# Patient Record
Sex: MALE | Race: BLACK OR AFRICAN AMERICAN | HISPANIC OR LATINO | URBAN - METROPOLITAN AREA
[De-identification: names, ages, dates, MRNs, and addresses within clinical notes are randomized per-mention and may not be internally consistent; named-entity substitution may affect disease eponyms.]

---

## 2017-08-25 ENCOUNTER — INPATIENT (INPATIENT)
Facility: HOSPITAL | Age: 82
LOS: 6 days | Discharge: NURSING, REHAB OR LONG-TERM CARE | DRG: 193 | End: 2017-09-01
Attending: INTERNAL MEDICINE | Admitting: INTERNAL MEDICINE
Payer: MEDICARE

## 2017-08-25 VITALS
DIASTOLIC BLOOD PRESSURE: 78 MMHG | TEMPERATURE: 99 F | WEIGHT: 199.96 LBS | SYSTOLIC BLOOD PRESSURE: 138 MMHG | OXYGEN SATURATION: 97 % | RESPIRATION RATE: 19 BRPM | HEART RATE: 64 BPM

## 2017-08-25 DIAGNOSIS — J18.9 PNEUMONIA, UNSPECIFIED ORGANISM: ICD-10-CM

## 2017-08-25 DIAGNOSIS — I63.9 CEREBRAL INFARCTION, UNSPECIFIED: ICD-10-CM

## 2017-08-25 DIAGNOSIS — R56.9 UNSPECIFIED CONVULSIONS: ICD-10-CM

## 2017-08-25 DIAGNOSIS — N40.0 BENIGN PROSTATIC HYPERPLASIA WITHOUT LOWER URINARY TRACT SYMPTOMS: ICD-10-CM

## 2017-08-25 DIAGNOSIS — I50.9 HEART FAILURE, UNSPECIFIED: ICD-10-CM

## 2017-08-25 DIAGNOSIS — R79.89 OTHER SPECIFIED ABNORMAL FINDINGS OF BLOOD CHEMISTRY: ICD-10-CM

## 2017-08-25 DIAGNOSIS — I10 ESSENTIAL (PRIMARY) HYPERTENSION: ICD-10-CM

## 2017-08-25 DIAGNOSIS — Z29.9 ENCOUNTER FOR PROPHYLACTIC MEASURES, UNSPECIFIED: ICD-10-CM

## 2017-08-25 LAB
ALBUMIN SERPL ELPH-MCNC: 3.9 G/DL — SIGNIFICANT CHANGE UP (ref 3.5–5)
ALP SERPL-CCNC: 220 U/L — HIGH (ref 40–120)
ALT FLD-CCNC: 42 U/L DA — SIGNIFICANT CHANGE UP (ref 10–60)
ANION GAP SERPL CALC-SCNC: 9 MMOL/L — SIGNIFICANT CHANGE UP (ref 5–17)
APPEARANCE UR: CLEAR — SIGNIFICANT CHANGE UP
AST SERPL-CCNC: 35 U/L — SIGNIFICANT CHANGE UP (ref 10–40)
BACTERIA # UR AUTO: ABNORMAL /HPF
BASE EXCESS BLDA CALC-SCNC: -5.3 MMOL/L — LOW (ref -2–2)
BASOPHILS # BLD AUTO: 0.1 K/UL — SIGNIFICANT CHANGE UP (ref 0–0.2)
BASOPHILS NFR BLD AUTO: 1 % — SIGNIFICANT CHANGE UP (ref 0–2)
BILIRUB DIRECT SERPL-MCNC: 0.5 MG/DL — HIGH (ref 0–0.2)
BILIRUB INDIRECT FLD-MCNC: 1.2 MG/DL — HIGH (ref 0.2–1)
BILIRUB SERPL-MCNC: 1.6 MG/DL — HIGH (ref 0.2–1.2)
BILIRUB SERPL-MCNC: 1.7 MG/DL — HIGH (ref 0.2–1.2)
BILIRUB UR-MCNC: NEGATIVE — SIGNIFICANT CHANGE UP
BUN SERPL-MCNC: 23 MG/DL — HIGH (ref 7–18)
CALCIUM SERPL-MCNC: 9.1 MG/DL — SIGNIFICANT CHANGE UP (ref 8.4–10.5)
CHLORIDE SERPL-SCNC: 102 MMOL/L — SIGNIFICANT CHANGE UP (ref 96–108)
CK MB BLD-MCNC: 2 % — SIGNIFICANT CHANGE UP (ref 0–3.5)
CK MB CFR SERPL CALC: 2.2 NG/ML — SIGNIFICANT CHANGE UP (ref 0–3.6)
CK SERPL-CCNC: 110 U/L — SIGNIFICANT CHANGE UP (ref 35–232)
CO2 SERPL-SCNC: 22 MMOL/L — SIGNIFICANT CHANGE UP (ref 22–31)
COLOR SPEC: YELLOW — SIGNIFICANT CHANGE UP
CREAT SERPL-MCNC: 1.28 MG/DL — SIGNIFICANT CHANGE UP (ref 0.5–1.3)
DIFF PNL FLD: ABNORMAL
EOSINOPHIL # BLD AUTO: 0.1 K/UL — SIGNIFICANT CHANGE UP (ref 0–0.5)
EOSINOPHIL NFR BLD AUTO: 0.8 % — SIGNIFICANT CHANGE UP (ref 0–6)
EPI CELLS # UR: ABNORMAL (ref 0–10)
GLUCOSE SERPL-MCNC: 109 MG/DL — HIGH (ref 70–99)
GLUCOSE UR QL: NEGATIVE — SIGNIFICANT CHANGE UP
HCO3 BLDA-SCNC: 17 MMOL/L — LOW (ref 23–27)
HCT VFR BLD CALC: 38 % — LOW (ref 39–50)
HGB BLD-MCNC: 12.3 G/DL — LOW (ref 13–17)
HOROWITZ INDEX BLDA+IHG-RTO: 21 — SIGNIFICANT CHANGE UP
INR BLD: 3 RATIO — HIGH (ref 0.88–1.16)
KETONES UR-MCNC: ABNORMAL
LACTATE SERPL-SCNC: 2 MMOL/L — SIGNIFICANT CHANGE UP (ref 0.7–2)
LEUKOCYTE ESTERASE UR-ACNC: ABNORMAL
LYMPHOCYTES # BLD AUTO: 1.2 K/UL — SIGNIFICANT CHANGE UP (ref 1–3.3)
LYMPHOCYTES # BLD AUTO: 12.7 % — LOW (ref 13–44)
MCHC RBC-ENTMCNC: 28.5 PG — SIGNIFICANT CHANGE UP (ref 27–34)
MCHC RBC-ENTMCNC: 32.4 GM/DL — SIGNIFICANT CHANGE UP (ref 32–36)
MCV RBC AUTO: 87.8 FL — SIGNIFICANT CHANGE UP (ref 80–100)
MONOCYTES # BLD AUTO: 0.8 K/UL — SIGNIFICANT CHANGE UP (ref 0–0.9)
MONOCYTES NFR BLD AUTO: 8 % — SIGNIFICANT CHANGE UP (ref 2–14)
NEUTROPHILS # BLD AUTO: 7.5 K/UL — HIGH (ref 1.8–7.4)
NEUTROPHILS NFR BLD AUTO: 77.5 % — HIGH (ref 43–77)
NITRITE UR-MCNC: NEGATIVE — SIGNIFICANT CHANGE UP
NT-PROBNP SERPL-SCNC: 4337 PG/ML — HIGH (ref 0–450)
PCO2 BLDA: 27 MMHG — LOW (ref 32–46)
PH BLDA: 7.43 — SIGNIFICANT CHANGE UP (ref 7.35–7.45)
PH UR: 5 — SIGNIFICANT CHANGE UP (ref 5–8)
PLATELET # BLD AUTO: 177 K/UL — SIGNIFICANT CHANGE UP (ref 150–400)
PO2 BLDA: 77 MMHG — SIGNIFICANT CHANGE UP (ref 74–108)
POTASSIUM SERPL-MCNC: 4.3 MMOL/L — SIGNIFICANT CHANGE UP (ref 3.5–5.3)
POTASSIUM SERPL-SCNC: 4.3 MMOL/L — SIGNIFICANT CHANGE UP (ref 3.5–5.3)
PROT SERPL-MCNC: 8.2 G/DL — SIGNIFICANT CHANGE UP (ref 6–8.3)
PROT UR-MCNC: 100
PROTHROM AB SERPL-ACNC: 33.4 SEC — HIGH (ref 9.8–12.7)
RBC # BLD: 4.33 M/UL — SIGNIFICANT CHANGE UP (ref 4.2–5.8)
RBC # FLD: 13.5 % — SIGNIFICANT CHANGE UP (ref 10.3–14.5)
RBC CASTS # UR COMP ASSIST: ABNORMAL /HPF (ref 0–2)
SAO2 % BLDA: 95 % — SIGNIFICANT CHANGE UP (ref 92–96)
SODIUM SERPL-SCNC: 133 MMOL/L — LOW (ref 135–145)
SP GR SPEC: 1.02 — SIGNIFICANT CHANGE UP (ref 1.01–1.02)
TROPONIN I SERPL-MCNC: 0.09 NG/ML — HIGH (ref 0–0.04)
TROPONIN I SERPL-MCNC: 0.12 NG/ML — HIGH (ref 0–0.04)
UROBILINOGEN FLD QL: 4
WBC # BLD: 9.7 K/UL — SIGNIFICANT CHANGE UP (ref 3.8–10.5)
WBC # FLD AUTO: 9.7 K/UL — SIGNIFICANT CHANGE UP (ref 3.8–10.5)
WBC UR QL: SIGNIFICANT CHANGE UP /HPF (ref 0–5)

## 2017-08-25 PROCEDURE — 76705 ECHO EXAM OF ABDOMEN: CPT | Mod: 26

## 2017-08-25 PROCEDURE — 71010: CPT | Mod: 26

## 2017-08-25 PROCEDURE — 71275 CT ANGIOGRAPHY CHEST: CPT | Mod: 26

## 2017-08-25 PROCEDURE — 99285 EMERGENCY DEPT VISIT HI MDM: CPT

## 2017-08-25 RX ORDER — LEVETIRACETAM 250 MG/1
1 TABLET, FILM COATED ORAL
Qty: 0 | Refills: 0 | COMMUNITY

## 2017-08-25 RX ORDER — WARFARIN SODIUM 2.5 MG/1
5 TABLET ORAL ONCE
Qty: 0 | Refills: 0 | Status: DISCONTINUED | OUTPATIENT
Start: 2017-08-25 | End: 2017-08-25

## 2017-08-25 RX ORDER — LOSARTAN POTASSIUM 100 MG/1
50 TABLET, FILM COATED ORAL DAILY
Qty: 0 | Refills: 0 | Status: DISCONTINUED | OUTPATIENT
Start: 2017-08-25 | End: 2017-08-25

## 2017-08-25 RX ORDER — WARFARIN SODIUM 2.5 MG/1
3 TABLET ORAL ONCE
Qty: 0 | Refills: 0 | Status: COMPLETED | OUTPATIENT
Start: 2017-08-25 | End: 2017-08-25

## 2017-08-25 RX ORDER — LOSARTAN POTASSIUM 100 MG/1
1 TABLET, FILM COATED ORAL
Qty: 0 | Refills: 0 | COMMUNITY

## 2017-08-25 RX ORDER — ASPIRIN/CALCIUM CARB/MAGNESIUM 324 MG
325 TABLET ORAL ONCE
Qty: 0 | Refills: 0 | Status: COMPLETED | OUTPATIENT
Start: 2017-08-25 | End: 2017-08-25

## 2017-08-25 RX ORDER — ASPIRIN/CALCIUM CARB/MAGNESIUM 324 MG
81 TABLET ORAL DAILY
Qty: 0 | Refills: 0 | Status: DISCONTINUED | OUTPATIENT
Start: 2017-08-25 | End: 2017-09-01

## 2017-08-25 RX ORDER — IPRATROPIUM/ALBUTEROL SULFATE 18-103MCG
3 AEROSOL WITH ADAPTER (GRAM) INHALATION ONCE
Qty: 0 | Refills: 0 | Status: COMPLETED | OUTPATIENT
Start: 2017-08-25 | End: 2017-08-25

## 2017-08-25 RX ORDER — LEVETIRACETAM 250 MG/1
1000 TABLET, FILM COATED ORAL
Qty: 0 | Refills: 0 | Status: DISCONTINUED | OUTPATIENT
Start: 2017-08-25 | End: 2017-09-01

## 2017-08-25 RX ORDER — TAMSULOSIN HYDROCHLORIDE 0.4 MG/1
0.4 CAPSULE ORAL AT BEDTIME
Qty: 0 | Refills: 0 | Status: DISCONTINUED | OUTPATIENT
Start: 2017-08-25 | End: 2017-09-01

## 2017-08-25 RX ORDER — FUROSEMIDE 40 MG
20 TABLET ORAL ONCE
Qty: 0 | Refills: 0 | Status: COMPLETED | OUTPATIENT
Start: 2017-08-25 | End: 2017-08-25

## 2017-08-25 RX ORDER — METOPROLOL TARTRATE 50 MG
12.5 TABLET ORAL
Qty: 0 | Refills: 0 | Status: DISCONTINUED | OUTPATIENT
Start: 2017-08-25 | End: 2017-08-29

## 2017-08-25 RX ORDER — CITALOPRAM 10 MG/1
10 TABLET, FILM COATED ORAL DAILY
Qty: 0 | Refills: 0 | Status: DISCONTINUED | OUTPATIENT
Start: 2017-08-25 | End: 2017-09-01

## 2017-08-25 RX ORDER — ATORVASTATIN CALCIUM 80 MG/1
40 TABLET, FILM COATED ORAL AT BEDTIME
Qty: 0 | Refills: 0 | Status: DISCONTINUED | OUTPATIENT
Start: 2017-08-25 | End: 2017-09-01

## 2017-08-25 RX ORDER — FUROSEMIDE 40 MG
40 TABLET ORAL DAILY
Qty: 0 | Refills: 0 | Status: DISCONTINUED | OUTPATIENT
Start: 2017-08-25 | End: 2017-08-29

## 2017-08-25 RX ORDER — LOSARTAN POTASSIUM 100 MG/1
25 TABLET, FILM COATED ORAL DAILY
Qty: 0 | Refills: 0 | Status: DISCONTINUED | OUTPATIENT
Start: 2017-08-25 | End: 2017-09-01

## 2017-08-25 RX ORDER — TAMSULOSIN HYDROCHLORIDE 0.4 MG/1
1 CAPSULE ORAL
Qty: 0 | Refills: 0 | COMMUNITY

## 2017-08-25 RX ORDER — IPRATROPIUM/ALBUTEROL SULFATE 18-103MCG
3 AEROSOL WITH ADAPTER (GRAM) INHALATION EVERY 6 HOURS
Qty: 0 | Refills: 0 | Status: DISCONTINUED | OUTPATIENT
Start: 2017-08-25 | End: 2017-09-01

## 2017-08-25 RX ORDER — MECLIZINE HCL 12.5 MG
1 TABLET ORAL
Qty: 0 | Refills: 0 | COMMUNITY

## 2017-08-25 RX ORDER — CITALOPRAM 10 MG/1
1 TABLET, FILM COATED ORAL
Qty: 0 | Refills: 0 | COMMUNITY

## 2017-08-25 RX ORDER — CEFTRIAXONE 500 MG/1
1 INJECTION, POWDER, FOR SOLUTION INTRAMUSCULAR; INTRAVENOUS EVERY 24 HOURS
Qty: 0 | Refills: 0 | Status: DISCONTINUED | OUTPATIENT
Start: 2017-08-25 | End: 2017-09-01

## 2017-08-25 RX ADMIN — Medication 20 MILLIGRAM(S): at 13:34

## 2017-08-25 RX ADMIN — LEVETIRACETAM 1000 MILLIGRAM(S): 250 TABLET, FILM COATED ORAL at 18:53

## 2017-08-25 RX ADMIN — Medication 100 MILLIGRAM(S): at 18:52

## 2017-08-25 RX ADMIN — Medication 3 MILLILITER(S): at 21:24

## 2017-08-25 RX ADMIN — CEFTRIAXONE 100 GRAM(S): 500 INJECTION, POWDER, FOR SOLUTION INTRAMUSCULAR; INTRAVENOUS at 22:44

## 2017-08-25 RX ADMIN — Medication 3 MILLILITER(S): at 16:39

## 2017-08-25 RX ADMIN — WARFARIN SODIUM 3 MILLIGRAM(S): 2.5 TABLET ORAL at 22:33

## 2017-08-25 RX ADMIN — Medication 325 MILLIGRAM(S): at 13:35

## 2017-08-25 RX ADMIN — TAMSULOSIN HYDROCHLORIDE 0.4 MILLIGRAM(S): 0.4 CAPSULE ORAL at 22:44

## 2017-08-25 RX ADMIN — Medication 20 MILLIGRAM(S): at 12:16

## 2017-08-25 RX ADMIN — Medication 3 MILLILITER(S): at 10:15

## 2017-08-25 RX ADMIN — ATORVASTATIN CALCIUM 40 MILLIGRAM(S): 80 TABLET, FILM COATED ORAL at 22:44

## 2017-08-25 RX ADMIN — Medication 12.5 MILLIGRAM(S): at 18:54

## 2017-08-25 NOTE — ED PROVIDER NOTE - CARDIAC, MLM
Normal rate, regular rhythm.  Heart sounds S1, S2.  No murmurs, rubs or gallops. Pt w/ pedal edema b/l

## 2017-08-25 NOTE — H&P ADULT - PROBLEM SELECTOR PLAN 3
Patient has BPH, on Flomax at home, as per wife since last few months he has hesitancy, dribbling, difficulty in urination   Mild suprapubic tenderness noted on exam   Will do the bladder scan to check the post void residual  If patient retaining then might need palumbo  C/w Flomax for now Patient has BPH, on Flomax at home, as per wife since last few months he has hesitancy, dribbling, difficulty in urination   In the Ed patient urinating well  Mild suprapubic tenderness noted on exam   Will do the bladder scan to check the post void residual  If patient retaining then might need palumbo  C/w Flomax for now

## 2017-08-25 NOTE — H&P ADULT - RS GEN PE MLT RESP DETAILS PC
breath sounds equal/no wheezes/normal/no intercostal retractions/no rales/respirations non-labored/no rhonchi/no chest wall tenderness/no subcutaneous emphysema

## 2017-08-25 NOTE — H&P ADULT - PROBLEM SELECTOR PLAN 7
C/w home does of losartan 50 mg, for bisoprolol 5 mg daily ( equivalent is 50 mg, will give 12.5 BID as BP on low side)   DASH/TLC diet

## 2017-08-25 NOTE — ED PROVIDER NOTE - OBJECTIVE STATEMENT
82 y/o M pt w/ PMHx of multiple CVA and seizures presents to ED c/o SOB x1 week. Pt denies fever, chills, chest pain, nausea, vomiting, cough, or any other complaints. NKDA.

## 2017-08-25 NOTE — H&P ADULT - PROBLEM SELECTOR PLAN 6
As per patient he takes warfarin at home 5 mg , placed about 2 yrs ago, last INR checked about 1 month ago.  F/up on INR, home dose is 5 mg coumadin As per patient he takes warfarin at home 5 mg , placed about 2 yrs ago, last INR checked about 1 month ago.  F/up on INR, home dose is 5 mg coumadin, will give 3 mg today

## 2017-08-25 NOTE — H&P ADULT - PROBLEM SELECTOR PLAN 1
Worsening SOB x 1 week  Dry cough, no fever, no wbc count   Bilateral crackles on exam   CXR shows multifocal pneumonia, also congested   Got Levaquin by ED before sending blood cultures  On EKG Qtc is very prolonged to about 513 ms, no prior EKG to compare   Will give the Rocephin and doxycycline, as cannot give macrolide due to qtc prolong  ID consult   Give bronchodilators PRN , O2 supplemental PRN Worsening SOB x 1 week  Dry cough, no fever, no wbc count   Bilateral crackles on exam   CXR shows multifocal pneumonia, also congested   Got Levaquin by ED before sending blood cultures  On EKG Qtc is very prolonged to about 513 ms, no prior EKG to compare   Will give the Rocephin and doxycycline, as cannot give macrolide due to qtc prolong  Give bronchodilators PRN , O2 supplemental PRN  F/up on blood cultures

## 2017-08-25 NOTE — H&P ADULT - ASSESSMENT
In the ED vitals stable, labs penitent for Na of 133, BUN 23, Bl of 1.6, , T1 0.041, proBNP is 4337, ABG done shows mild respiratory alkalosis but no hypoxia, CXR shows pulmonary congestion, right sided pleural effusion multifocal pneumonia, CT angio chest negative for PE, got aspirin, Duoneb Lasix 20 x 2, EKG shows wide QRS rhythm with premature complexes, QTC of 513 ms, admitted to tele for ruling out ACS, CHF exacerbation, multifocal pneumonia.

## 2017-08-25 NOTE — H&P ADULT - NSHPLABSRESULTS_GEN_ALL_CORE
12.3   9.7   )-----------( 177      ( 25 Aug 2017 09:47 )             38.0       133<L>  |  102  |  23<H>  ----------------------------<  109<H>  4.3   |  22  |  1.28    Ca    9.1      25 Aug 2017 09:47    TPro  8.2  /  Alb  3.9  /  TBili  1.6<H>  /  DBili  x   /  AST  35  /  ALT  42  /  AlkPhos  220<H>    CARDIAC MARKERS ( 25 Aug 2017 09:47 )  0.091 ng/mL / x     / x     / x     / x        ABG - ( 25 Aug 2017 10:07 )  pH: 7.43  /  pCO2: 27    /  pO2: 77    / HCO3: 17    / Base Excess: -5.3  /  SaO2: 95            Urinalysis Basic - ( 25 Aug 2017 09:47 )    Color: Yellow / Appearance: Clear / S.025 / pH: x  Gluc: x / Ketone: Trace  / Bili: Negative / Urobili: 4   Blood: x / Protein: 100 / Nitrite: Negative   Leuk Esterase: Trace / RBC: 5-10 /HPF / WBC 0-2 /HPF   Sq Epi: x / Non Sq Epi: x / Bacteria: Moderate /HPF    Vital Signs Last 24 Hrs  T(C): 36.4 (25 Aug 2017 15:47), Max: 37.1 (25 Aug 2017 08:15)  T(F): 97.5 (25 Aug 2017 15:47), Max: 98.8 (25 Aug 2017 08:15)  HR: 61 (25 Aug 2017 15:47) (61 - 64)  BP: 129/87 (25 Aug 2017 15:47) (129/87 - 138/78)  BP(mean): --  RR: 17 (25 Aug 2017 15:47) (17 - 19)  SpO2: 99% (25 Aug 2017 15:47) (97% - 99%)    US Hepatic & Pancreatic (17 @ 13:43) >  Hepatic fibrofatty changes.  No biliary ductal dilatation.  Gallbladder not visualized.  Right pleural effusion.    < from: CT Angio Chest w/ IV Cont (17 @ 13:41) >    No evidence of pulmonary embolism. Moderate right and small left pleural effusion with adjacent compressive atelectasis. Mild interstitial pulmonary edema.    < from: Xray Chest 1 View AP-PORTABLE IMMEDIATE (17 @ 09:27) >    Cardiomegaly with multifocal pneumonia and congestive change. Moderate   right pleural effusion.

## 2017-08-25 NOTE — H&P ADULT - ATTENDING COMMENTS
seen and examined  d/w ER  md, resident.  pt was brought for sob, mild cough, no fever  from 4-5 days.  apetite ok.  no cp or palp.  recently lost 15 lbs as per family pcp advised to do that.  apetite is fair.  lungs clear.  trace pedal edema,  as per family. no mi in past, but had 2 cvas with rt sided weakness mainly rt U ext.   cva, aphasic, ppm, htn, bph , seizure.

## 2017-08-25 NOTE — H&P ADULT - HISTORY OF PRESENT ILLNESS
82 y/o M, Guinean speaking, walks with cane, lives with wife,  PMHx of CVA x 2 in past ( 2013,, 2014, on warfarin), seizures, BPH, HTN, PPM ( placed about 2 yrs ago, last interrogated about 2 months ago)presents to ED c/o SOB x1 week. History provided by the granddaughter and wife at the bedside. According to her patient usually feels SOB while he walks short distance uses 3- 4 pillows at night, associated with lower extremity swelling. He has PPM placed about 2 yrs ago as per family his heart not functioning fine.  He has accident in 2012, head surgery done , after that he has another stroke, placed on warfarin. Last INR checked about a month ago.  He also has dry cough. Denies fever, chills, chest pain, nausea, vomiting, sick contacts, recent travel, or any other complaints. His last visit to PMD about a week ago. Never had EGD and colonoscopy done.

## 2017-08-25 NOTE — H&P ADULT - PROBLEM SELECTOR PLAN 2
SOB, lower extremity swelling   CXR shows cardiomegaly with multifocal pneumonia and congestive change. Moderate right pleural effusion.   Probnp elevated to 4337  Mild Bilateral crackles on exam   S/p Lasix 20 x 2 in ED , patient has some difficulty in urinating , but he urinated well  C/w lasix 40 mg IV daily   Monitor I/Os, daily weights   f/up on ECHO , telemetry   No chest pain   EKG shows paced rhythm, wide QRS rhythm with premature complexes, QTC of 513 ms, no ST , T wave abnormality  T1 is slightly elevated to 0.041, f/up on T2 and t3   Has PPM ( last interrogated about 2 months ago as per wife) , company name does not remember by the patient   C/w aspirin, statin, beta blocker  Cardio consult SOB, lower extremity swelling   CXR shows cardiomegaly with multifocal pneumonia and congestive change. Moderate right pleural effusion.   Probnp elevated to 4337  Mild Bilateral crackles on exam   S/p Lasix 20 x 2 in ED , patient has some difficulty in urinating , but he urinated well  C/w lasix 40 mg IV daily   Monitor I/Os, daily weights   f/up on ECHO , telemetry   No chest pain   EKG shows paced rhythm, wide QRS rhythm with premature complexes, QTC of 513 ms, no ST , T wave abnormality  T1 is slightly elevated to 0.041, f/up on T2 and t3   Has PPM ( last interrogated about 2 months ago as per wife) , [employer].  C/w aspirin, statin, beta blocker  Cardio consult Dr Hairston

## 2017-08-25 NOTE — H&P ADULT - MUSCULOSKELETAL
detailed exam ROM intact/no joint warmth/no calf tenderness/no joint swelling/normal strength/no joint erythema/normal

## 2017-08-25 NOTE — H&P ADULT - GASTROINTESTINAL DETAILS
bowel sounds normal/no organomegaly/no guarding/soft/normal/no rigidity/no bruit/no rebound tenderness

## 2017-08-26 DIAGNOSIS — N39.0 URINARY TRACT INFECTION, SITE NOT SPECIFIED: ICD-10-CM

## 2017-08-26 LAB
24R-OH-CALCIDIOL SERPL-MCNC: 33.9 NG/ML — SIGNIFICANT CHANGE UP (ref 30–100)
ALBUMIN SERPL ELPH-MCNC: 3.5 G/DL — SIGNIFICANT CHANGE UP (ref 3.5–5)
ALP SERPL-CCNC: 174 U/L — HIGH (ref 40–120)
ALT FLD-CCNC: 34 U/L DA — SIGNIFICANT CHANGE UP (ref 10–60)
ANION GAP SERPL CALC-SCNC: 10 MMOL/L — SIGNIFICANT CHANGE UP (ref 5–17)
AST SERPL-CCNC: 37 U/L — SIGNIFICANT CHANGE UP (ref 10–40)
BASOPHILS # BLD AUTO: 0.1 K/UL — SIGNIFICANT CHANGE UP (ref 0–0.2)
BASOPHILS NFR BLD AUTO: 1 % — SIGNIFICANT CHANGE UP (ref 0–2)
BILIRUB SERPL-MCNC: 1.7 MG/DL — HIGH (ref 0.2–1.2)
BUN SERPL-MCNC: 23 MG/DL — HIGH (ref 7–18)
CALCIUM SERPL-MCNC: 8.6 MG/DL — SIGNIFICANT CHANGE UP (ref 8.4–10.5)
CHLORIDE SERPL-SCNC: 99 MMOL/L — SIGNIFICANT CHANGE UP (ref 96–108)
CHOLEST SERPL-MCNC: 120 MG/DL — SIGNIFICANT CHANGE UP (ref 10–199)
CK MB BLD-MCNC: 1.8 % — SIGNIFICANT CHANGE UP (ref 0–3.5)
CK MB BLD-MCNC: 1.9 % — SIGNIFICANT CHANGE UP (ref 0–3.5)
CK MB BLD-MCNC: 2.1 % — SIGNIFICANT CHANGE UP (ref 0–3.5)
CK MB CFR SERPL CALC: 4.6 NG/ML — HIGH (ref 0–3.6)
CK MB CFR SERPL CALC: 6.5 NG/ML — HIGH (ref 0–3.6)
CK MB CFR SERPL CALC: 6.6 NG/ML — HIGH (ref 0–3.6)
CK SERPL-CCNC: 216 U/L — SIGNIFICANT CHANGE UP (ref 35–232)
CK SERPL-CCNC: 336 U/L — HIGH (ref 35–232)
CK SERPL-CCNC: 368 U/L — HIGH (ref 35–232)
CO2 SERPL-SCNC: 22 MMOL/L — SIGNIFICANT CHANGE UP (ref 22–31)
CREAT SERPL-MCNC: 1.35 MG/DL — HIGH (ref 0.5–1.3)
EOSINOPHIL # BLD AUTO: 0.1 K/UL — SIGNIFICANT CHANGE UP (ref 0–0.5)
EOSINOPHIL NFR BLD AUTO: 0.9 % — SIGNIFICANT CHANGE UP (ref 0–6)
FOLATE SERPL-MCNC: 10.2 NG/ML — SIGNIFICANT CHANGE UP (ref 4.8–24.2)
GGT SERPL-CCNC: 436 U/L — HIGH (ref 9–50)
GLUCOSE SERPL-MCNC: 92 MG/DL — SIGNIFICANT CHANGE UP (ref 70–99)
HAV IGM SER-ACNC: SIGNIFICANT CHANGE UP
HBA1C BLD-MCNC: 5.7 % — HIGH (ref 4–5.6)
HBV CORE AB SER-ACNC: REACTIVE
HBV CORE IGM SER-ACNC: SIGNIFICANT CHANGE UP
HBV SURFACE AG SER-ACNC: SIGNIFICANT CHANGE UP
HCT VFR BLD CALC: 33.9 % — LOW (ref 39–50)
HCV AB S/CO SERPL IA: 0.21 S/CO — SIGNIFICANT CHANGE UP
HCV AB SERPL-IMP: SIGNIFICANT CHANGE UP
HDLC SERPL-MCNC: 42 MG/DL — SIGNIFICANT CHANGE UP (ref 40–125)
HGB BLD-MCNC: 11.4 G/DL — LOW (ref 13–17)
INR BLD: 3.22 RATIO — HIGH (ref 0.88–1.16)
LIPID PNL WITH DIRECT LDL SERPL: 66 MG/DL — SIGNIFICANT CHANGE UP
LYMPHOCYTES # BLD AUTO: 1 K/UL — SIGNIFICANT CHANGE UP (ref 1–3.3)
LYMPHOCYTES # BLD AUTO: 12.4 % — LOW (ref 13–44)
MAGNESIUM SERPL-MCNC: 1.9 MG/DL — SIGNIFICANT CHANGE UP (ref 1.6–2.6)
MCHC RBC-ENTMCNC: 29.5 PG — SIGNIFICANT CHANGE UP (ref 27–34)
MCHC RBC-ENTMCNC: 33.6 GM/DL — SIGNIFICANT CHANGE UP (ref 32–36)
MCV RBC AUTO: 87.8 FL — SIGNIFICANT CHANGE UP (ref 80–100)
MONOCYTES # BLD AUTO: 0.8 K/UL — SIGNIFICANT CHANGE UP (ref 0–0.9)
MONOCYTES NFR BLD AUTO: 10.5 % — SIGNIFICANT CHANGE UP (ref 2–14)
NEUTROPHILS # BLD AUTO: 6 K/UL — SIGNIFICANT CHANGE UP (ref 1.8–7.4)
NEUTROPHILS NFR BLD AUTO: 75.2 % — SIGNIFICANT CHANGE UP (ref 43–77)
PHOSPHATE SERPL-MCNC: 3.3 MG/DL — SIGNIFICANT CHANGE UP (ref 2.5–4.5)
PLATELET # BLD AUTO: 121 K/UL — LOW (ref 150–400)
POTASSIUM SERPL-MCNC: 3.8 MMOL/L — SIGNIFICANT CHANGE UP (ref 3.5–5.3)
POTASSIUM SERPL-SCNC: 3.8 MMOL/L — SIGNIFICANT CHANGE UP (ref 3.5–5.3)
PROCALCITONIN SERPL-MCNC: 0.18 NG/ML — HIGH (ref 0–0.04)
PROT SERPL-MCNC: 7.1 G/DL — SIGNIFICANT CHANGE UP (ref 6–8.3)
PROTHROM AB SERPL-ACNC: 35.9 SEC — HIGH (ref 9.8–12.7)
RBC # BLD: 3.86 M/UL — LOW (ref 4.2–5.8)
RBC # FLD: 13.1 % — SIGNIFICANT CHANGE UP (ref 10.3–14.5)
SODIUM SERPL-SCNC: 131 MMOL/L — LOW (ref 135–145)
TOTAL CHOLESTEROL/HDL RATIO MEASUREMENT: 2.9 RATIO — LOW (ref 3.4–9.6)
TRIGL SERPL-MCNC: 62 MG/DL — SIGNIFICANT CHANGE UP (ref 10–149)
TROPONIN I SERPL-MCNC: 0.16 NG/ML — HIGH (ref 0–0.04)
TROPONIN I SERPL-MCNC: 0.19 NG/ML — HIGH (ref 0–0.04)
TROPONIN I SERPL-MCNC: 0.21 NG/ML — HIGH (ref 0–0.04)
TSH SERPL-MCNC: 7.2 UU/ML — HIGH (ref 0.34–4.82)
VIT B12 SERPL-MCNC: 370 PG/ML — SIGNIFICANT CHANGE UP (ref 243–894)
WBC # BLD: 8 K/UL — SIGNIFICANT CHANGE UP (ref 3.8–10.5)
WBC # FLD AUTO: 8 K/UL — SIGNIFICANT CHANGE UP (ref 3.8–10.5)

## 2017-08-26 RX ORDER — FOLIC ACID 0.8 MG
1 TABLET ORAL DAILY
Qty: 0 | Refills: 0 | Status: DISCONTINUED | OUTPATIENT
Start: 2017-08-26 | End: 2017-09-01

## 2017-08-26 RX ORDER — LEVOTHYROXINE SODIUM 125 MCG
12.5 TABLET ORAL DAILY
Qty: 0 | Refills: 0 | Status: DISCONTINUED | OUTPATIENT
Start: 2017-08-26 | End: 2017-09-01

## 2017-08-26 RX ORDER — PREGABALIN 225 MG/1
500 CAPSULE ORAL DAILY
Qty: 0 | Refills: 0 | Status: DISCONTINUED | OUTPATIENT
Start: 2017-08-26 | End: 2017-09-01

## 2017-08-26 RX ORDER — WARFARIN SODIUM 2.5 MG/1
3 TABLET ORAL ONCE
Qty: 0 | Refills: 0 | Status: DISCONTINUED | OUTPATIENT
Start: 2017-08-26 | End: 2017-08-26

## 2017-08-26 RX ADMIN — LEVETIRACETAM 1000 MILLIGRAM(S): 250 TABLET, FILM COATED ORAL at 06:21

## 2017-08-26 RX ADMIN — Medication 100 MILLIGRAM(S): at 06:23

## 2017-08-26 RX ADMIN — CEFTRIAXONE 100 GRAM(S): 500 INJECTION, POWDER, FOR SOLUTION INTRAMUSCULAR; INTRAVENOUS at 22:39

## 2017-08-26 RX ADMIN — Medication 40 MILLIGRAM(S): at 06:21

## 2017-08-26 RX ADMIN — Medication 12.5 MILLIGRAM(S): at 17:05

## 2017-08-26 RX ADMIN — TAMSULOSIN HYDROCHLORIDE 0.4 MILLIGRAM(S): 0.4 CAPSULE ORAL at 22:38

## 2017-08-26 RX ADMIN — Medication 81 MILLIGRAM(S): at 13:34

## 2017-08-26 RX ADMIN — Medication 3 MILLILITER(S): at 03:11

## 2017-08-26 RX ADMIN — ATORVASTATIN CALCIUM 40 MILLIGRAM(S): 80 TABLET, FILM COATED ORAL at 22:38

## 2017-08-26 RX ADMIN — Medication 3 MILLILITER(S): at 09:23

## 2017-08-26 RX ADMIN — Medication 3 MILLILITER(S): at 14:40

## 2017-08-26 RX ADMIN — LEVETIRACETAM 1000 MILLIGRAM(S): 250 TABLET, FILM COATED ORAL at 17:05

## 2017-08-26 RX ADMIN — LOSARTAN POTASSIUM 25 MILLIGRAM(S): 100 TABLET, FILM COATED ORAL at 06:24

## 2017-08-26 RX ADMIN — Medication 100 MILLIGRAM(S): at 17:05

## 2017-08-26 RX ADMIN — Medication 12.5 MILLIGRAM(S): at 06:24

## 2017-08-26 RX ADMIN — CITALOPRAM 10 MILLIGRAM(S): 10 TABLET, FILM COATED ORAL at 13:34

## 2017-08-26 RX ADMIN — Medication 3 MILLILITER(S): at 20:58

## 2017-08-26 NOTE — PROGRESS NOTE ADULT - PROBLEM SELECTOR PLAN 6
As per patient he takes warfarin at home 5 mg , placed about 2 yrs ago, last INR checked about 1 month ago.  F/up on INR, home dose is 5 mg coumadin, will give 3 mg today C/w home meds keppra 1000 mg BID  F/up on levels

## 2017-08-26 NOTE — PROGRESS NOTE ADULT - PROBLEM SELECTOR PLAN 1
Worsening SOB x 1 week  Dry cough, no fever, no wbc count   Bilateral crackles on exam   CXR shows multifocal pneumonia, also congested   Got Levaquin by ED before sending blood cultures  On EKG Qtc is very prolonged to about 513 ms, no prior EKG to compare   continue with Rocephin and doxycycline  Give bronchodilators PRN , O2 supplemental PRN  F/up on blood cultures

## 2017-08-26 NOTE — PHYSICAL THERAPY INITIAL EVALUATION ADULT - CRITERIA FOR SKILLED THERAPEUTIC INTERVENTIONS
anticipated discharge recommendation/rehab potential/impairments found/functional limitations in following categories/risk reduction/prevention/therapy frequency/predicted duration of therapy intervention

## 2017-08-26 NOTE — PHYSICAL THERAPY INITIAL EVALUATION ADULT - DIAGNOSIS, PT EVAL
Overall decline in functional mobility due to generalized  weakness, SOB with increasing activities; decreased balance and endurance.

## 2017-08-26 NOTE — CONSULT NOTE ADULT - SUBJECTIVE AND OBJECTIVE BOX
Patient is a 83y old  Male who presents with a chief complaint of SOB x 1 week (25 Aug 2017 15:31)      HPI:  82 y/o M, Tajik speaking, walks with cane, lives with wife,  PMHx of CVA x 2 in past ( ,, , on warfarin), seizures, BPH, HTN, PPM ( placed about 2 yrs ago, last interrogated about 2 months ago)presents to ED c/o SOB x1 week. History provided by the granddaughter and wife at the bedside. According to her patient usually feels SOB while he walks short distance uses 3- 4 pillows at night, associated with lower extremity swelling. He has PPM placed about 2 yrs ago as per family his heart not functioning fine.  He has accident in , head surgery done , after that he has another stroke, placed on warfarin. Last INR checked about a month ago.  He also has dry cough. Denies fever, chills, chest pain, nausea, vomiting, sick contacts, recent travel, or any other complaints. His last visit to PMD about a week ago. Never had EGD and colonoscopy done. (25 Aug 2017 15:31)      PAST MEDICAL & SURGICAL HISTORY:  Seizure  CVA (cerebral vascular accident)  No significant past surgical history      PREVIOUS DIAGNOSTIC TESTING:      ECHO  FINDINGS:    STRESS  FINDINGS:    CATHETERIZATION  FINDINGS:    MEDICATIONS  (STANDING):  furosemide   Injectable 40 milliGRAM(s) IV Push daily  ALBUTerol/ipratropium for Nebulization 3 milliLiter(s) Nebulizer every 6 hours  aspirin  chewable 81 milliGRAM(s) Oral daily  atorvastatin 40 milliGRAM(s) Oral at bedtime  metoprolol 12.5 milliGRAM(s) Oral two times a day  cefTRIAXone   IVPB 1 Gram(s) IV Intermittent every 24 hours  doxycycline hyclate Capsule 100 milliGRAM(s) Oral every 12 hours  tamsulosin 0.4 milliGRAM(s) Oral at bedtime  levETIRAcetam 1000 milliGRAM(s) Oral two times a day  citalopram 10 milliGRAM(s) Oral daily  losartan 25 milliGRAM(s) Oral daily    MEDICATIONS  (PRN):      FAMILY HISTORY:      SOCIAL HISTORY:    CIGARETTES:    ALCOHOL:    REVIEW OF SYSTEMS:  CONSTITUTIONAL: No fever, weight loss, or fatigue  EYES: No eye pain, visual disturbances, or discharge  ENMT:  No difficulty hearing, tinnitus, vertigo; No sinus or throat pain  NECK: No pain or stiffness  RESPIRATORY: No cough, wheezing, chills or hemoptysis; No shortness of breath  CARDIOVASCULAR: No chest pain, palpitations, dizziness, or leg swelling  GASTROINTESTINAL: No abdominal or epigastric pain. No nausea, vomiting, or hematemesis; No diarrhea or constipation. No melena or hematochezia.  GENITOURINARY: No dysuria, frequency, hematuria, or incontinence  NEUROLOGICAL: No headaches, memory loss, loss of strength, numbness, or tremors  SKIN: No itching, burning, rashes, or lesions   LYMPH NODES: No enlarged glands  ENDOCRINE: No heat or cold intolerance; No hair loss  MUSCULOSKELETAL: No joint pain or swelling; No muscle, back, or extremity pain  PSYCHIATRIC: No depression, anxiety, mood swings, or difficulty sleeping  HEME/LYMPH: No easy bruising, or bleeding gums  ALLERY AND IMMUNOLOGIC: No hives or eczema    Vital Signs Last 24 Hrs  T(C): 36.6 (26 Aug 2017 11:49), Max: 36.9 (25 Aug 2017 22:46)  T(F): 97.8 (26 Aug 2017 11:49), Max: 98.5 (25 Aug 2017 22:46)  HR: 66 (26 Aug 2017 11:49) (61 - 75)  BP: 101/55 (26 Aug 2017 11:49) (101/55 - 134/74)  BP(mean): --  RR: 16 (26 Aug 2017 11:49) (16 - 18)  SpO2: 100% (26 Aug 2017 11:49) (96% - 906%)      PHYSICAL EXAM:  GENERAL: NAD, well-groomed, well-developed  HEAD:  Atraumatic, Normocephalic  EYES: EOMI, PERRLA, conjunctiva and sclera clear  ENMT: No tonsillar erythema, exudates, or enlargement; Moist mucous membranes, Good dentition, No lesions  NECK: Supple, No JVD, Normal thyroid  NERVOUS SYSTEM:  Alert & Oriented X3, Good concentration; Motor Strength 5/5 B/L upper and lower extremities; DTRs 2+ intact and symmetric  CHEST/LUNG: Clear to percussion bilaterally; No rales, rhonchi, wheezing, or rubs  HEART: Regular rate and rhythm; No murmurs, rubs, or gallops  ABDOMEN: Soft, Nontender, Nondistended; Bowel sounds present  EXTREMITIES:  2+ Peripheral Pulses, No clubbing, cyanosis, or edema  LYMPH: No lymphadenopathy noted  SKIN: No rashes or lesions      INTERPRETATION OF TELEMETRY:    ECG:    JULIANNEDSVASC:     LABS:                        11.4   8.0   )-----------( 121      ( 26 Aug 2017 06:22 )             33.9         131<L>  |  99  |  23<H>  ----------------------------<  92  3.8   |  22  |  1.35<H>    Ca    8.6      26 Aug 2017 06:22  Phos  3.3       Mg     1.9         TPro  7.1  /  Alb  3.5  /  TBili  1.7<H>  /  DBili  x   /  AST  37  /  ALT  34  /  AlkPhos  174<H>      CARDIAC MARKERS ( 26 Aug 2017 08:50 )  0.204 ng/mL / x     / 283 U/L / x     / 5.5 ng/mL  CARDIAC MARKERS ( 26 Aug 2017 00:09 )  0.162 ng/mL / x     / 216 U/L / x     / 4.6 ng/mL  CARDIAC MARKERS ( 25 Aug 2017 16:51 )  0.118 ng/mL / x     / 110 U/L / x     / 2.2 ng/mL  CARDIAC MARKERS ( 25 Aug 2017 09:47 )  0.091 ng/mL / x     / x     / x     / x          PT/INR - ( 26 Aug 2017 06:22 )   PT: 35.9 sec;   INR: 3.22 ratio           Urinalysis Basic - ( 25 Aug 2017 09:47 )    Color: Yellow / Appearance: Clear / S.025 / pH: x  Gluc: x / Ketone: Trace  / Bili: Negative / Urobili: 4   Blood: x / Protein: 100 / Nitrite: Negative   Leuk Esterase: Trace / RBC: 5-10 /HPF / WBC 0-2 /HPF   Sq Epi: x / Non Sq Epi: x / Bacteria: Moderate /HPF      Lipid Panel: Cholesterol, Serum 120  Direct LDL 66  HDL Cholesterol, Serum 42  Triglycerides, Serum 62    I&O's Summary      RADIOLOGY & ADDITIONAL STUDIES:

## 2017-08-26 NOTE — PROGRESS NOTE ADULT - PROBLEM SELECTOR PLAN 7
C/w home does of losartan 50 mg, for bisoprolol 5 mg daily ( equivalent is 50 mg, will give 12.5 BID as BP on low side)   DASH/TLC diet As per patient he takes warfarin at home 5 mg , placed about 2 yrs ago, last INR checked about 1 month ago.  F/up on INR, home dose is 5 mg coumadin, will give 3 mg today

## 2017-08-26 NOTE — PROGRESS NOTE ADULT - PROBLEM SELECTOR PLAN 3
Patient has BPH, on Flomax at home, as per wife since last few months he has hesitancy, dribbling, difficulty in urination   In the Ed patient urinating well  Mild suprapubic tenderness noted on exam   Will do the bladder scan to check the post void residual  If patient retaining then might need palumbo  C/w Flomax for now

## 2017-08-26 NOTE — PHYSICAL THERAPY INITIAL EVALUATION ADULT - GAIT DEVIATIONS NOTED, PT EVAL
decreased weight-shifting ability/decreased stride length/decreased velocity of limb motion/decreased step length

## 2017-08-26 NOTE — PHYSICAL THERAPY INITIAL EVALUATION ADULT - TRANSFER SAFETY CONCERNS NOTED: SIT/STAND, REHAB EVAL
decreased balance during turns/decreased weight-shifting ability/decreased sequencing ability/decreased step length

## 2017-08-26 NOTE — PROGRESS NOTE ADULT - ASSESSMENT
possible chf and pneumonia combination  antibx, diuresis  afib on coumadine  ine 3.2  inr in AM  renal insuff  watch,  mild elevation of lft.  hepatitis b, c neg  cva rt sied weakness and contrature, aphasia.  cardiology to f/u possible chf and pneumonia combination  antibx, diuresis  afib on coumadine  ine 3.2  inr in AM  renal insuff  watch,  mild elevation of lft.  hepatitis b, c neg  cva rt sied weakness and contrature, aphasia.  cardiology to f/u for elevated trp

## 2017-08-26 NOTE — PHYSICAL THERAPY INITIAL EVALUATION ADULT - PLANNED THERAPY INTERVENTIONS, PT EVAL
motor coordination training/gait training/strengthening/balance training/transfer training/stretching

## 2017-08-26 NOTE — PHYSICAL THERAPY INITIAL EVALUATION ADULT - IMPAIRED TRANSFERS: SIT/STAND, REHAB EVAL
impaired balance/impaired coordination/decreased ROM/impaired postural control/impaired motor control/decreased strength/decreased flexibility/abnormal muscle tone

## 2017-08-26 NOTE — PHYSICAL THERAPY INITIAL EVALUATION ADULT - PASSIVE RANGE OF MOTION EXAMINATION, REHAB EVAL
Right shoulder flex 100; abd 80; right wrist and fingers limited in extension due to increased tone/deficits as listed below/Left UE Passive ROM was WNL (within normal limits)/Left LE Passive ROM was WFL (within functional limits)

## 2017-08-26 NOTE — PHYSICAL THERAPY INITIAL EVALUATION ADULT - IMPAIRMENTS CONTRIBUTING TO GAIT DEVIATIONS, PT EVAL
decreased ROM/decreased flexibility/impaired motor control/abnormal muscle tone/impaired balance/decreased strength/impaired postural control/impaired coordination

## 2017-08-26 NOTE — PHYSICAL THERAPY INITIAL EVALUATION ADULT - GENERAL OBSERVATIONS, REHAB EVAL
Pt. found sitting in chair with his daughter present at bedside. Pt. on cardiac monitoring; uses O2 via NC at 2L. Pt. is aphasic and with right sided weakness (from previous stroke).

## 2017-08-26 NOTE — PROGRESS NOTE ADULT - PROBLEM SELECTOR PLAN 8
Improve score is 1, 3 month risk is 0.4, c/w coumadin for DVT ppx  No need for GI px. C/w home does of losartan 50 mg, for bisoprolol 5 mg daily ( equivalent is 50 mg, will give 12.5 BID as BP on low side)   DASH/TLC diet

## 2017-08-26 NOTE — PHYSICAL THERAPY INITIAL EVALUATION ADULT - ACTIVE RANGE OF MOTION EXAMINATION, REHAB EVAL
Left UE Active ROM was WNL (within normal limits)/Pt. with right sided weakness/Left LE Active ROM was WFL (within functional limits)

## 2017-08-26 NOTE — PROGRESS NOTE ADULT - PROBLEM SELECTOR PLAN 2
SOB, lower extremity swelling   CXR shows cardiomegaly with multifocal pneumonia and congestive change. Moderate right pleural effusion.   Probnp elevated to 4337  Mild Bilateral crackles on exam   S/p Lasix 20 x 2 in ED , patient has some difficulty in urinating , but he urinated well  C/w lasix 40 mg IV daily   Monitor I/Os, daily weights   f/up on ECHO , telemetry   No chest pain   EKG shows paced rhythm, wide QRS rhythm with premature complexes, QTC of 513 ms, no ST , T wave abnormality  T1 is slightly elevated to 0.041, f/up on T2 and t3   Has PPM ( last interrogated about 2 months ago as per wife) , [employer].  C/w aspirin, statin, beta blocker  Cardio consult Dr Hairston

## 2017-08-26 NOTE — PHYSICAL THERAPY INITIAL EVALUATION ADULT - BALANCE DISTURBANCE, IDENTIFIED IMPAIRMENT CONTRIBUTE, REHAB EVAL
impaired postural control/impaired motor control/abnormal muscle tone/impaired coordination/decreased ROM/decreased strength

## 2017-08-26 NOTE — PROGRESS NOTE ADULT - PROBLEM SELECTOR PLAN 4
ALP elevated about 220, AST/ ALT normal , bilirubin is 1.6  U/s hepatic and pancreas shows hepatic fibrofatty changes, no dilatation, right sided pleural effusion  F/up on hepatic panel, direct and indirect bl  Monitor the LFts  HepB core antibody positive,  f/u HBe antigen grossly positive UA with gross blood  f/u urine culture as outpatient grossly positive UA with moderate blood  f/u urine culture as outpatient

## 2017-08-26 NOTE — PHYSICAL THERAPY INITIAL EVALUATION ADULT - IMPAIRMENTS FOUND, PT EVAL
aerobic capacity/endurance/fine motor/muscle strength/gross motor/gait, locomotion, and balance/ROM/ventilation and respiration/gas exchange

## 2017-08-26 NOTE — PROGRESS NOTE ADULT - SUBJECTIVE AND OBJECTIVE BOX
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 History provided by the granddaughter and wife at the bedside. According to her patient usually feels SOB while he walks short distance uses 3- 4 pillows at night, associated with lower extremity swelling. He has PPM placed about 2 yrs ago as per family his heart not functioning fine.  He has accident in , head surgery done , after that he has another stroke, placed on warfarin. Last INR checked about a month ago.  He also has dry cough. Denies fever, chills, chest pain, nausea, vomiting, sick contacts, recent travel, or any other complaints. His last visit to PMD about a week ago. Never had EGD and colonoscopy done. (25 Aug 2017 15:31)      Patient is a 83y old  Male who presents with a chief complaint of SOB x 1 week (25 Aug 2017 15:31)      INTERVAL HPI/OVERNIGHT EVENTS:  T(C): 36.6 (17 @ 11:49), Max: 36.9 (17 @ 22:46)  HR: 66 (17 @ 11:49) (61 - 75)  BP: 101/55 (17 @ 11:49) (101/55 - 134/74)  RR: 16 (17 @ 11:49) (16 - 18)  SpO2: 100% (17 @ 11:49) (96% - 906%)  Wt(kg): --  I&O's Summary      REVIEW OF SYSTEMS: denies fever, chills, SOB, palpitations, chest pain, abdominal pain, nausea, vomitting, diarrhea, constipation, dizziness    MEDICATIONS  (STANDING):  furosemide   Injectable 40 milliGRAM(s) IV Push daily  ALBUTerol/ipratropium for Nebulization 3 milliLiter(s) Nebulizer every 6 hours  aspirin  chewable 81 milliGRAM(s) Oral daily  atorvastatin 40 milliGRAM(s) Oral at bedtime  metoprolol 12.5 milliGRAM(s) Oral two times a day  cefTRIAXone   IVPB 1 Gram(s) IV Intermittent every 24 hours  doxycycline hyclate Capsule 100 milliGRAM(s) Oral every 12 hours  tamsulosin 0.4 milliGRAM(s) Oral at bedtime  levETIRAcetam 1000 milliGRAM(s) Oral two times a day  citalopram 10 milliGRAM(s) Oral daily  losartan 25 milliGRAM(s) Oral daily  warfarin 3 milliGRAM(s) Oral once  levothyroxine 12.5 MICROGram(s) Oral daily  cyanocobalamin 500 MICROGram(s) Oral daily  folic acid 0.4 milliGRAM(s) Oral daily    MEDICATIONS  (PRN):      PHYSICAL EXAM:  GENERAL: NAD, well-groomed, well-developed  HEAD:  Atraumatic, Normocephalic  EYES: EOMI, PERRLA, conjunctiva and sclera clear  ENMT: No tonsillar erythema, exudates, or enlargement; Moist mucous membranes, Good dentition, No lesions  NECK: Supple, No JVD, Normal thyroid  NERVOUS SYSTEM:  Alert & Oriented X3, Good concentration; Motor Strength 5/5 B/L upper and lower extremities; DTRs 2+ intact and symmetric  CHEST/LUNG: Clear to percussion bilaterally; No rales, rhonchi, wheezing, or rubs  HEART: Regular rate and rhythm; No murmurs, rubs, or gallops  ABDOMEN: Soft, Nontender, Nondistended; Bowel sounds present  EXTREMITIES:  2+ Peripheral Pulses, No clubbing, cyanosis, or edema  LYMPH: No lymphadenopathy noted  SKIN: No rashes or lesions  LABS:                        11.4   8.0   )-----------( 121      ( 26 Aug 2017 06:22 )             33.9         131<L>  |  99  |  23<H>  ----------------------------<  92  3.8   |  22  |  1.35<H>    Ca    8.6      26 Aug 2017 06:22  Phos  3.3       Mg     1.9         TPro  7.1  /  Alb  3.5  /  TBili  1.7<H>  /  DBili  x   /  AST  37  /  ALT  34  /  AlkPhos  174<H>      PT/INR - ( 26 Aug 2017 06:22 )   PT: 35.9 sec;   INR: 3.22 ratio           Urinalysis Basic - ( 25 Aug 2017 09:47 )    Color: Yellow / Appearance: Clear / S.025 / pH: x  Gluc: x / Ketone: Trace  / Bili: Negative / Urobili: 4   Blood: x / Protein: 100 / Nitrite: Negative   Leuk Esterase: Trace / RBC: 5-10 /HPF / WBC 0-2 /HPF   Sq Epi: x / Non Sq Epi: x / Bacteria: Moderate /HPF      CAPILLARY BLOOD GLUCOSE          ABG - ( 25 Aug 2017 10:07 )  pH: 7.43  /  pCO2: 27    /  pO2: 77    / HCO3: 17    / Base Excess: -5.3  /  SaO2: 95                Urinalysis Basic - ( 25 Aug 2017 09:47 )    Color: Yellow / Appearance: Clear / S.025 / pH: x  Gluc: x / Ketone: Trace  / Bili: Negative / Urobili: 4   Blood: x / Protein: 100 / Nitrite: Negative   Leuk Esterase: Trace / RBC: 5-10 /HPF / WBC 0-2 /HPF   Sq Epi: x / Non Sq Epi: x / Bacteria: Moderate /HPF

## 2017-08-26 NOTE — PROGRESS NOTE ADULT - SUBJECTIVE AND OBJECTIVE BOX
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 History provided by the granddaughter and wife at the bedside. According to her patient usually feels SOB while he walks short distance uses 3- 4 pillows at night, associated with lower extremity swelling. He has PPM placed about 2 yrs ago as per family his heart not functioning fine.  He has accident in , head surgery done , after that he has another stroke, placed on warfarin. Last INR checked about a month ago.  He also has dry cough. Denies fever, chills, chest pain, nausea, vomiting, sick contacts, recent travel, or any other complaints. His last visit to PMD about a week ago. Never had EGD and colonoscopy done. (25 Aug 2017 15:31)      Patient is a 83y old  Male who presents with a chief complaint of SOB x 1 week (25 Aug 2017 15:31)      INTERVAL HPI/OVERNIGHT EVENTS:    Pt seen lying in bed, comfortable. Pt is nonverbal. Pt's daughter is at bedside.   Nurse reports no acute events overnight.     T(C): 36.6 (17 @ 11:49), Max: 36.9 (17 @ 22:46)  HR: 66 (17 @ 11:49) (61 - 75)  BP: 101/55 (17 @ 11:49) (101/55 - 134/74)  RR: 16 (17 @ 11:49) (16 - 18)  SpO2: 100% (17 @ 11:49) (96% - 906%)  Wt(kg): --  I&O's Summary      REVIEW OF SYSTEMS: denies fever, chills, SOB, palpitations, chest pain, abdominal pain, nausea, vomitting, diarrhea, constipation, dizziness    MEDICATIONS  (STANDING):  furosemide   Injectable 40 milliGRAM(s) IV Push daily  ALBUTerol/ipratropium for Nebulization 3 milliLiter(s) Nebulizer every 6 hours  aspirin  chewable 81 milliGRAM(s) Oral daily  atorvastatin 40 milliGRAM(s) Oral at bedtime  metoprolol 12.5 milliGRAM(s) Oral two times a day  cefTRIAXone   IVPB 1 Gram(s) IV Intermittent every 24 hours  doxycycline hyclate Capsule 100 milliGRAM(s) Oral every 12 hours  tamsulosin 0.4 milliGRAM(s) Oral at bedtime  levETIRAcetam 1000 milliGRAM(s) Oral two times a day  citalopram 10 milliGRAM(s) Oral daily  losartan 25 milliGRAM(s) Oral daily    MEDICATIONS  (PRN):      PHYSICAL EXAM:  GENERAL: NAD, well-developed  HEAD:  Atraumatic, Normocephalic  EYES: EOMI, conjunctiva and sclera clear  NECK: Supple, No JVD  NERVOUS SYSTEM:  Alert & Oriented X3, Good concentration  CHEST/LUNG: wheeze heard on RLL  HEART: Regular rate and rhythm; No murmurs, rubs, or gallops  ABDOMEN: Soft, Nontender, Nondistended; Bowel sounds present  EXTREMITIES:  2+ Peripheral Pulses, No clubbing, cyanosis, or edema    LABS:                        11.4   8.0   )-----------( 121      ( 26 Aug 2017 06:22 )             33.9     08-    131<L>  |  99  |  23<H>  ----------------------------<  92  3.8   |  22  |  1.35<H>    Ca    8.6      26 Aug 2017 06:22  Phos  3.3     -  Mg     1.9         TPro  7.1  /  Alb  3.5  /  TBili  1.7<H>  /  DBili  x   /  AST  37  /  ALT  34  /  AlkPhos  174<H>  -    PT/INR - ( 26 Aug 2017 06:22 )   PT: 35.9 sec;   INR: 3.22 ratio           Urinalysis Basic - ( 25 Aug 2017 09:47 )    Color: Yellow / Appearance: Clear / S.025 / pH: x  Gluc: x / Ketone: Trace  / Bili: Negative / Urobili: 4   Blood: x / Protein: 100 / Nitrite: Negative   Leuk Esterase: Trace / RBC: 5-10 /HPF / WBC 0-2 /HPF   Sq Epi: x / Non Sq Epi: x / Bacteria: Moderate /HPF      CAPILLARY BLOOD GLUCOSE          ABG - ( 25 Aug 2017 10:07 )  pH: 7.43  /  pCO2: 27    /  pO2: 77    / HCO3: 17    / Base Excess: -5.3  /  SaO2: 95                Urinalysis Basic - ( 25 Aug 2017 09:47 )    Color: Yellow / Appearance: Clear / S.025 / pH: x  Gluc: x / Ketone: Trace  / Bili: Negative / Urobili: 4   Blood: x / Protein: 100 / Nitrite: Negative   Leuk Esterase: Trace / RBC: 5-10 /HPF / WBC 0-2 /HPF   Sq Epi: x / Non Sq Epi: x / Bacteria: Moderate /HPF

## 2017-08-27 LAB
ALBUMIN SERPL ELPH-MCNC: 3.7 G/DL — SIGNIFICANT CHANGE UP (ref 3.5–5)
ALP SERPL-CCNC: 185 U/L — HIGH (ref 40–120)
ALT FLD-CCNC: 38 U/L DA — SIGNIFICANT CHANGE UP (ref 10–60)
ANION GAP SERPL CALC-SCNC: 10 MMOL/L — SIGNIFICANT CHANGE UP (ref 5–17)
AST SERPL-CCNC: 49 U/L — HIGH (ref 10–40)
BILIRUB SERPL-MCNC: 1.4 MG/DL — HIGH (ref 0.2–1.2)
BUN SERPL-MCNC: 21 MG/DL — HIGH (ref 7–18)
CALCIUM SERPL-MCNC: 8.7 MG/DL — SIGNIFICANT CHANGE UP (ref 8.4–10.5)
CHLORIDE SERPL-SCNC: 97 MMOL/L — SIGNIFICANT CHANGE UP (ref 96–108)
CO2 SERPL-SCNC: 23 MMOL/L — SIGNIFICANT CHANGE UP (ref 22–31)
CREAT SERPL-MCNC: 1.33 MG/DL — HIGH (ref 0.5–1.3)
GLUCOSE SERPL-MCNC: 101 MG/DL — HIGH (ref 70–99)
HCT VFR BLD CALC: 35.2 % — LOW (ref 39–50)
HGB BLD-MCNC: 11.7 G/DL — LOW (ref 13–17)
INR BLD: 3.17 RATIO — HIGH (ref 0.88–1.16)
LEVETIRACETAM SERPL-MCNC: 39.7 MCG/ML — SIGNIFICANT CHANGE UP (ref 12–46)
MAGNESIUM SERPL-MCNC: 1.9 MG/DL — SIGNIFICANT CHANGE UP (ref 1.6–2.6)
MCHC RBC-ENTMCNC: 29.2 PG — SIGNIFICANT CHANGE UP (ref 27–34)
MCHC RBC-ENTMCNC: 33.3 GM/DL — SIGNIFICANT CHANGE UP (ref 32–36)
MCV RBC AUTO: 87.7 FL — SIGNIFICANT CHANGE UP (ref 80–100)
PHOSPHATE SERPL-MCNC: 3.5 MG/DL — SIGNIFICANT CHANGE UP (ref 2.5–4.5)
PLATELET # BLD AUTO: 142 K/UL — LOW (ref 150–400)
POTASSIUM SERPL-MCNC: 3.3 MMOL/L — LOW (ref 3.5–5.3)
POTASSIUM SERPL-SCNC: 3.3 MMOL/L — LOW (ref 3.5–5.3)
PROT SERPL-MCNC: 7.8 G/DL — SIGNIFICANT CHANGE UP (ref 6–8.3)
PROTHROM AB SERPL-ACNC: 35.4 SEC — HIGH (ref 9.8–12.7)
RBC # BLD: 4.01 M/UL — LOW (ref 4.2–5.8)
RBC # FLD: 13.1 % — SIGNIFICANT CHANGE UP (ref 10.3–14.5)
SODIUM SERPL-SCNC: 130 MMOL/L — LOW (ref 135–145)
WBC # BLD: 9.8 K/UL — SIGNIFICANT CHANGE UP (ref 3.8–10.5)
WBC # FLD AUTO: 9.8 K/UL — SIGNIFICANT CHANGE UP (ref 3.8–10.5)

## 2017-08-27 PROCEDURE — 74000: CPT | Mod: 26

## 2017-08-27 RX ORDER — TRAMADOL HYDROCHLORIDE 50 MG/1
25 TABLET ORAL ONCE
Qty: 0 | Refills: 0 | Status: DISCONTINUED | OUTPATIENT
Start: 2017-08-27 | End: 2017-08-27

## 2017-08-27 RX ORDER — POTASSIUM CHLORIDE 20 MEQ
40 PACKET (EA) ORAL EVERY 4 HOURS
Qty: 0 | Refills: 0 | Status: COMPLETED | OUTPATIENT
Start: 2017-08-27 | End: 2017-08-27

## 2017-08-27 RX ADMIN — Medication 12.5 MICROGRAM(S): at 06:20

## 2017-08-27 RX ADMIN — Medication 3 MILLILITER(S): at 03:19

## 2017-08-27 RX ADMIN — LEVETIRACETAM 1000 MILLIGRAM(S): 250 TABLET, FILM COATED ORAL at 06:21

## 2017-08-27 RX ADMIN — TRAMADOL HYDROCHLORIDE 25 MILLIGRAM(S): 50 TABLET ORAL at 12:39

## 2017-08-27 RX ADMIN — LEVETIRACETAM 1000 MILLIGRAM(S): 250 TABLET, FILM COATED ORAL at 17:38

## 2017-08-27 RX ADMIN — Medication 3 MILLILITER(S): at 20:13

## 2017-08-27 RX ADMIN — Medication 100 MILLIGRAM(S): at 17:38

## 2017-08-27 RX ADMIN — PREGABALIN 500 MICROGRAM(S): 225 CAPSULE ORAL at 12:39

## 2017-08-27 RX ADMIN — Medication 12.5 MILLIGRAM(S): at 06:22

## 2017-08-27 RX ADMIN — Medication 81 MILLIGRAM(S): at 12:39

## 2017-08-27 RX ADMIN — Medication 40 MILLIEQUIVALENT(S): at 13:52

## 2017-08-27 RX ADMIN — Medication 40 MILLIGRAM(S): at 06:18

## 2017-08-27 RX ADMIN — CEFTRIAXONE 100 GRAM(S): 500 INJECTION, POWDER, FOR SOLUTION INTRAMUSCULAR; INTRAVENOUS at 21:26

## 2017-08-27 RX ADMIN — ATORVASTATIN CALCIUM 40 MILLIGRAM(S): 80 TABLET, FILM COATED ORAL at 21:26

## 2017-08-27 RX ADMIN — Medication 1 MILLIGRAM(S): at 12:39

## 2017-08-27 RX ADMIN — CITALOPRAM 10 MILLIGRAM(S): 10 TABLET, FILM COATED ORAL at 12:39

## 2017-08-27 RX ADMIN — TAMSULOSIN HYDROCHLORIDE 0.4 MILLIGRAM(S): 0.4 CAPSULE ORAL at 21:26

## 2017-08-27 RX ADMIN — Medication 100 MILLIGRAM(S): at 06:20

## 2017-08-27 RX ADMIN — Medication 40 MILLIEQUIVALENT(S): at 10:10

## 2017-08-27 RX ADMIN — TRAMADOL HYDROCHLORIDE 25 MILLIGRAM(S): 50 TABLET ORAL at 13:50

## 2017-08-27 RX ADMIN — LOSARTAN POTASSIUM 25 MILLIGRAM(S): 100 TABLET, FILM COATED ORAL at 06:22

## 2017-08-27 RX ADMIN — Medication 3 MILLILITER(S): at 09:22

## 2017-08-27 RX ADMIN — Medication 12.5 MILLIGRAM(S): at 17:38

## 2017-08-27 NOTE — PROGRESS NOTE ADULT - SUBJECTIVE AND OBJECTIVE BOX
HPI:  84 y/o M, Yi speaking, walks with cane, lives with wife,  PMHx of CVA x 2 in past ( 2013,, 2014, on warfarin), seizures, BPH, HTN, PPM ( placed about 2 yrs ago, last interrogated about 2 months ago)presents to ED c/o SOB x1 week. History provided by the granddaughter and wife at the bedside. According to her patient usually feels SOB while he walks short distance uses 3- 4 pillows at night, associated with lower extremity swelling. He has PPM placed about 2 yrs ago as per family his heart not functioning fine.  He has accident in 2012, head surgery done , after that he has another stroke, placed on warfarin. Last INR checked about a month ago.  He also has dry cough. Denies fever, chills, chest pain, nausea, vomiting, sick contacts, recent travel, or any other complaints. His last visit to PMD about a week ago. Never had EGD and colonoscopy done. (25 Aug 2017 15:31)      Patient is a 83y old  Male who presents with a chief complaint of SOB x 1 week (25 Aug 2017 15:31)    PT is c/o abd pain in lowe abd from today am.  no nausea or vomiting, ate also.  no dysurea.      INTERVAL HPI/OVERNIGHT EVENTS:  T(C): 36.6 (08-27-17 @ 15:44), Max: 37 (08-27-17 @ 04:42)  HR: 63 (08-27-17 @ 15:44) (63 - 86)  BP: 115/74 (08-27-17 @ 15:44) (110/69 - 145/83)  RR: 18 (08-27-17 @ 15:44) (16 - 22)  SpO2: 100% (08-27-17 @ 15:44) (95% - 100%)  Wt(kg): --  I&O's Summary    26 Aug 2017 07:01  -  27 Aug 2017 07:00  --------------------------------------------------------  IN: 800 mL / OUT: 0 mL / NET: 800 mL        REVIEW OF SYSTEMS: denies fever, chills, SOB, palpitations, chest pain, abdominal pain, nausea, vomitting, diarrhea, constipation, dizziness    MEDICATIONS  (STANDING):  furosemide   Injectable 40 milliGRAM(s) IV Push daily  ALBUTerol/ipratropium for Nebulization 3 milliLiter(s) Nebulizer every 6 hours  aspirin  chewable 81 milliGRAM(s) Oral daily  atorvastatin 40 milliGRAM(s) Oral at bedtime  metoprolol 12.5 milliGRAM(s) Oral two times a day  cefTRIAXone   IVPB 1 Gram(s) IV Intermittent every 24 hours  doxycycline hyclate Capsule 100 milliGRAM(s) Oral every 12 hours  tamsulosin 0.4 milliGRAM(s) Oral at bedtime  levETIRAcetam 1000 milliGRAM(s) Oral two times a day  citalopram 10 milliGRAM(s) Oral daily  losartan 25 milliGRAM(s) Oral daily  levothyroxine 12.5 MICROGram(s) Oral daily  cyanocobalamin 500 MICROGram(s) Oral daily  folic acid 1 milliGRAM(s) Oral daily    MEDICATIONS  (PRN):      PHYSICAL EXAM:  GENERAL: NAD, well-groomed, well-developed  HEAD:  Atraumatic, Normocephalic  EYES: EOMI, PERRLA, conjunctiva and sclera clear  ENMT: No tonsillar erythema, exudates, or enlargement; Moist mucous membranes, Good dentition, No lesions  NECK: Supple, No JVD, Normal thyroid  NERVOUS SYSTEM:  Alert & Oriented X3, Good concentration; Motor Strength 5/5 B/L upper and lower extremities; DTRs 2+ intact and symmetric  CHEST/LUNG: Clear to percussion bilaterally; No rales, rhonchi, wheezing, or rubs  HEART: Regular rate and rhythm; No murmurs, rubs, or gallops  ABDOMEN: Soft, , Nondistended; Bowel sounds present   rt lower abd  and suprapubic tenderness .  mcburney negative.  cruz nrg  EXTREMITIES:  2+ Peripheral Pulses, No clubbing, cyanosis, or edema  LYMPH: No lymphadenopathy noted  SKIN: No rashes or lesions  LABS:                        11.7   9.8   )-----------( 142      ( 27 Aug 2017 07:24 )             35.2     08-27    130<L>  |  97  |  21<H>  ----------------------------<  101<H>  3.3<L>   |  23  |  1.33<H>    Ca    8.7      27 Aug 2017 07:24  Phos  3.5     08-27  Mg     1.9     08-27    TPro  7.8  /  Alb  3.7  /  TBili  1.4<H>  /  DBili  x   /  AST  49<H>  /  ALT  38  /  AlkPhos  185<H>  08-27    PT/INR - ( 27 Aug 2017 07:24 )   PT: 35.4 sec;   INR: 3.17 ratio             CAPILLARY BLOOD GLUCOSE

## 2017-08-27 NOTE — PROGRESS NOTE ADULT - SUBJECTIVE AND OBJECTIVE BOX
SUBJ:84 y/o M, Yakut speaking, walks with cane, lives with wife,  PMHx of CVA x 2 in past ( 2013,, 2014, on warfarin), seizures, BPH, HTN, PPM ( placed about 2 yrs ago, last interrogated about 2 months ago)presents to ED c/o SOB x1 week.No chest pain,refers to dysiria-unable to pass urine-Hx Penile implant,still c/o cough      MEDICATIONS  (STANDING):  furosemide   Injectable 40 milliGRAM(s) IV Push daily  ALBUTerol/ipratropium for Nebulization 3 milliLiter(s) Nebulizer every 6 hours  aspirin  chewable 81 milliGRAM(s) Oral daily  atorvastatin 40 milliGRAM(s) Oral at bedtime  metoprolol 12.5 milliGRAM(s) Oral two times a day  cefTRIAXone   IVPB 1 Gram(s) IV Intermittent every 24 hours  doxycycline hyclate Capsule 100 milliGRAM(s) Oral every 12 hours  tamsulosin 0.4 milliGRAM(s) Oral at bedtime  levETIRAcetam 1000 milliGRAM(s) Oral two times a day  citalopram 10 milliGRAM(s) Oral daily  losartan 25 milliGRAM(s) Oral daily  levothyroxine 12.5 MICROGram(s) Oral daily  cyanocobalamin 500 MICROGram(s) Oral daily  folic acid 1 milliGRAM(s) Oral daily  potassium chloride    Tablet ER 40 milliEquivalent(s) Oral every 4 hours    MEDICATIONS  (PRN):            Vital Signs Last 24 Hrs  T(C): 36.6 (27 Aug 2017 07:27), Max: 37 (27 Aug 2017 04:42)  T(F): 97.8 (27 Aug 2017 07:27), Max: 98.6 (27 Aug 2017 04:42)  HR: 63 (27 Aug 2017 07:27) (63 - 86)  BP: 145/83 (27 Aug 2017 07:27) (101/55 - 145/83)  BP(mean): --  RR: 18 (27 Aug 2017 07:27) (16 - 18)  SpO2: 100% (27 Aug 2017 07:27) (96% - 100%)    REVIEW OF SYSTEMS:  CONSTITUTIONAL: No fever, weight loss, or fatigue  EYES: No eye pain, visual disturbances, or discharge  ENMT:  No difficulty hearing, tinnitus, vertigo; No sinus or throat pain  NECK: No pain or stiffness  RESPIRATORY: No cough, wheezing, chills or hemoptysis; No shortness of breath  CARDIOVASCULAR: No chest pain, palpitations, dizziness, or leg swelling  GASTROINTESTINAL: No abdominal or epigastric pain. No nausea, vomiting, or hematemesis; No diarrhea or constipation. No melena or hematochezia.  GENITOURINARY: No dysuria, frequency, hematuria, or incontinence  NEUROLOGICAL: No headaches, memory loss, loss of strength, numbness, or tremors  SKIN: No itching, burning, rashes, or lesions   LYMPH NODES: No enlarged glands  ENDOCRINE: No heat or cold intolerance; No hair loss  MUSCULOSKELETAL: No joint pain or swelling; No muscle, back, or extremity pain  PSYCHIATRIC: No depression, anxiety, mood swings, or difficulty sleeping  HEME/LYMPH: No easy bruising, or bleeding gums  ALLERY AND IMMUNOLOGIC: No hives or eczema    PHYSICAL EXAM:  · CONSTITUTIONAL:	Well-developed, well nourished    BMI-  · EYES:	EOMI; PERRL; no drainage or redness  · ENMT	No oral lesions; no gross abnormalities  · NECK:	No bruits; no thyromegaly or nodules  ·BACK:	No deformity or limitation of movement  ·RESPIRATORY:   airway patent; breath sounds equal; good air movement; respirations non-labored; clear to auscultation bilaterally; no chest wall tenderness; no intercostal retractions; no rales,rhonchi or wheeze  · CARDIOVASCULAR	regular rate and rhythm  no rub  no murmur  normal PMI  . GASTROINTESTINAL:  no distention; no masses palpable; bowel sounds normal; no rebound tenderness; no guarding; no rigidity; no organomegaly  · EXTREMITIES: No cyanosis, clubbing 1+ edema  · VASCULAR: 	Equal and normal pulses (carotid, femoral, dorsalis pedis)  ·NEUROLOGICAL:   alert and oriented x 3; sensation intact; deep reflexes intact; cranial nerves intact; no spontaneous movement; superficial reflexes intact; normal strength  · SKIN:	No lesions; no rash  . LYMPH NODES:	No lymphadedenopathy  · MUSCULOSKELETAL:   No calf tenderness  no joint swelling	  TELEMETRY:Ventricular paced rhythm    ECG:Venyricular paced rhythm normal capture and sensing.    TTE:Pending    LABS:                        11.7   9.8   )-----------( 142      ( 27 Aug 2017 07:24 )             35.2     08-27    130<L>  |  97  |  21<H>  ----------------------------<  101<H>  3.3<L>   |  23  |  1.33<H>    Ca    8.7      27 Aug 2017 07:24  Phos  3.5     08-27  Mg     1.9     08-27    TPro  7.8  /  Alb  3.7  /  TBili  1.4<H>  /  DBili  x   /  AST  49<H>  /  ALT  38  /  AlkPhos  185<H>  08-27    CARDIAC MARKERS ( 26 Aug 2017 20:09 )  0.193 ng/mL / x     / 368 U/L / x     / 6.6 ng/mL  CARDIAC MARKERS ( 26 Aug 2017 13:33 )  0.215 ng/mL / x     / 336 U/L / x     / 6.5 ng/mL  CARDIAC MARKERS ( 26 Aug 2017 08:50 )  0.204 ng/mL / x     / 283 U/L / x     / 5.5 ng/mL  CARDIAC MARKERS ( 26 Aug 2017 00:09 )  0.162 ng/mL / x     / 216 U/L / x     / 4.6 ng/mL  CARDIAC MARKERS ( 25 Aug 2017 16:51 )  0.118 ng/mL / x     / 110 U/L / x     / 2.2 ng/mL      PT/INR - ( 27 Aug 2017 07:24 )   PT: 35.4 sec;   INR: 3.17 ratio      I&O's Summary    26 Aug 2017 07:01  -  27 Aug 2017 07:00  --------------------------------------------------------  IN: 800 mL / OUT: 0 mL / NET: 800 mL    RADIOLOGY & ADDITIONAL STUDIES:   CT ANGIO CHEST (W)AW IC      MPRESSION: No evidence of pulmonary embolismModerate right and small left pleural effusion with adjacent compressive  atelectasis.Mild interstitial pulmonary edema.        IMPRESSION AND PLAN:    Problem/Plan - 1:  ·  Problem: Multifocal pneumonia.  Plan: Worsening SOB x 1 week  Dry cough, no fever, no wbc count   Bilateral crackles on exam   CXR shows multifocal pneumonia, also congested   Got Levaquin by ED before sending blood cultures,awaiting Legionella titres    Problem/Plan - 2:  ·  Problem: CHF exacerbation.Likely Systolic-  Plan: SOB, lower extremity swelling   CXR shows cardiomegaly with multifocal pneumonia and congestive change. Moderate right pleural effusion.   Probnp elevated to 4337  Mild Bilateral crackles on exam   C/w lasix 40 mg IV daily   Monitor I/Os, daily weights -Difficulty urinating-Bladder scan  f/up on ECHO , telemetry

## 2017-08-28 ENCOUNTER — TRANSCRIPTION ENCOUNTER (OUTPATIENT)
Age: 82
End: 2017-08-28

## 2017-08-28 LAB
ALBUMIN SERPL ELPH-MCNC: 3.4 G/DL — LOW (ref 3.5–5)
ALP SERPL-CCNC: 174 U/L — HIGH (ref 40–120)
ALT FLD-CCNC: 36 U/L DA — SIGNIFICANT CHANGE UP (ref 10–60)
ANION GAP SERPL CALC-SCNC: 9 MMOL/L — SIGNIFICANT CHANGE UP (ref 5–17)
APPEARANCE UR: ABNORMAL
AST SERPL-CCNC: 50 U/L — HIGH (ref 10–40)
BASOPHILS # BLD AUTO: 0.1 K/UL — SIGNIFICANT CHANGE UP (ref 0–0.2)
BASOPHILS NFR BLD AUTO: 0.9 % — SIGNIFICANT CHANGE UP (ref 0–2)
BILIRUB SERPL-MCNC: 1.2 MG/DL — SIGNIFICANT CHANGE UP (ref 0.2–1.2)
BILIRUB UR-MCNC: ABNORMAL
BUN SERPL-MCNC: 22 MG/DL — HIGH (ref 7–18)
CALCIUM SERPL-MCNC: 9 MG/DL — SIGNIFICANT CHANGE UP (ref 8.4–10.5)
CHLORIDE SERPL-SCNC: 99 MMOL/L — SIGNIFICANT CHANGE UP (ref 96–108)
CO2 SERPL-SCNC: 24 MMOL/L — SIGNIFICANT CHANGE UP (ref 22–31)
COLOR SPEC: ABNORMAL
CREAT SERPL-MCNC: 1.15 MG/DL — SIGNIFICANT CHANGE UP (ref 0.5–1.3)
CULTURE RESULTS: NO GROWTH — SIGNIFICANT CHANGE UP
DIFF PNL FLD: ABNORMAL
EOSINOPHIL # BLD AUTO: 0.2 K/UL — SIGNIFICANT CHANGE UP (ref 0–0.5)
EOSINOPHIL NFR BLD AUTO: 2.4 % — SIGNIFICANT CHANGE UP (ref 0–6)
GLUCOSE SERPL-MCNC: 105 MG/DL — HIGH (ref 70–99)
GLUCOSE UR QL: NEGATIVE — SIGNIFICANT CHANGE UP
HCT VFR BLD CALC: 32.4 % — LOW (ref 39–50)
HGB BLD-MCNC: 11.1 G/DL — LOW (ref 13–17)
INR BLD: 3.11 RATIO — HIGH (ref 0.88–1.16)
KETONES UR-MCNC: ABNORMAL
LEUKOCYTE ESTERASE UR-ACNC: ABNORMAL
LYMPHOCYTES # BLD AUTO: 0.8 K/UL — LOW (ref 1–3.3)
LYMPHOCYTES # BLD AUTO: 8.3 % — LOW (ref 13–44)
MAGNESIUM SERPL-MCNC: 1.8 MG/DL — SIGNIFICANT CHANGE UP (ref 1.6–2.6)
MCHC RBC-ENTMCNC: 30.2 PG — SIGNIFICANT CHANGE UP (ref 27–34)
MCHC RBC-ENTMCNC: 34.2 GM/DL — SIGNIFICANT CHANGE UP (ref 32–36)
MCV RBC AUTO: 88.1 FL — SIGNIFICANT CHANGE UP (ref 80–100)
MONOCYTES # BLD AUTO: 0.9 K/UL — SIGNIFICANT CHANGE UP (ref 0–0.9)
MONOCYTES NFR BLD AUTO: 9.1 % — SIGNIFICANT CHANGE UP (ref 2–14)
NEUTROPHILS # BLD AUTO: 7.8 K/UL — HIGH (ref 1.8–7.4)
NEUTROPHILS NFR BLD AUTO: 79.3 % — HIGH (ref 43–77)
NITRITE UR-MCNC: POSITIVE
PH UR: 6.5 — SIGNIFICANT CHANGE UP (ref 5–8)
PHOSPHATE SERPL-MCNC: 3 MG/DL — SIGNIFICANT CHANGE UP (ref 2.5–4.5)
PLATELET # BLD AUTO: 132 K/UL — LOW (ref 150–400)
POTASSIUM SERPL-MCNC: 4 MMOL/L — SIGNIFICANT CHANGE UP (ref 3.5–5.3)
POTASSIUM SERPL-SCNC: 4 MMOL/L — SIGNIFICANT CHANGE UP (ref 3.5–5.3)
PROT SERPL-MCNC: 7 G/DL — SIGNIFICANT CHANGE UP (ref 6–8.3)
PROT UR-MCNC: 500 MG/DL
PROTHROM AB SERPL-ACNC: 34.7 SEC — HIGH (ref 9.8–12.7)
RBC # BLD: 3.68 M/UL — LOW (ref 4.2–5.8)
RBC # FLD: 13.4 % — SIGNIFICANT CHANGE UP (ref 10.3–14.5)
SODIUM SERPL-SCNC: 132 MMOL/L — LOW (ref 135–145)
SP GR SPEC: 1.02 — SIGNIFICANT CHANGE UP (ref 1.01–1.02)
SPECIMEN SOURCE: SIGNIFICANT CHANGE UP
UROBILINOGEN FLD QL: 4
WBC # BLD: 9.9 K/UL — SIGNIFICANT CHANGE UP (ref 3.8–10.5)
WBC # FLD AUTO: 9.9 K/UL — SIGNIFICANT CHANGE UP (ref 3.8–10.5)

## 2017-08-28 RX ORDER — ACETAMINOPHEN 500 MG
650 TABLET ORAL EVERY 6 HOURS
Qty: 0 | Refills: 0 | Status: DISCONTINUED | OUTPATIENT
Start: 2017-08-28 | End: 2017-09-01

## 2017-08-28 RX ADMIN — LEVETIRACETAM 1000 MILLIGRAM(S): 250 TABLET, FILM COATED ORAL at 05:35

## 2017-08-28 RX ADMIN — PREGABALIN 500 MICROGRAM(S): 225 CAPSULE ORAL at 12:44

## 2017-08-28 RX ADMIN — CITALOPRAM 10 MILLIGRAM(S): 10 TABLET, FILM COATED ORAL at 12:44

## 2017-08-28 RX ADMIN — Medication 81 MILLIGRAM(S): at 12:44

## 2017-08-28 RX ADMIN — Medication 12.5 MILLIGRAM(S): at 17:18

## 2017-08-28 RX ADMIN — Medication 1 MILLIGRAM(S): at 12:44

## 2017-08-28 RX ADMIN — ATORVASTATIN CALCIUM 40 MILLIGRAM(S): 80 TABLET, FILM COATED ORAL at 21:52

## 2017-08-28 RX ADMIN — TAMSULOSIN HYDROCHLORIDE 0.4 MILLIGRAM(S): 0.4 CAPSULE ORAL at 21:52

## 2017-08-28 RX ADMIN — Medication 3 MILLILITER(S): at 08:02

## 2017-08-28 RX ADMIN — Medication 100 MILLIGRAM(S): at 17:18

## 2017-08-28 RX ADMIN — CEFTRIAXONE 100 GRAM(S): 500 INJECTION, POWDER, FOR SOLUTION INTRAMUSCULAR; INTRAVENOUS at 21:52

## 2017-08-28 RX ADMIN — LEVETIRACETAM 1000 MILLIGRAM(S): 250 TABLET, FILM COATED ORAL at 17:18

## 2017-08-28 RX ADMIN — Medication 650 MILLIGRAM(S): at 11:20

## 2017-08-28 RX ADMIN — Medication 12.5 MICROGRAM(S): at 05:35

## 2017-08-28 RX ADMIN — Medication 100 MILLIGRAM(S): at 05:35

## 2017-08-28 RX ADMIN — Medication 650 MILLIGRAM(S): at 10:40

## 2017-08-28 NOTE — PROGRESS NOTE ADULT - PROBLEM SELECTOR PLAN 5
ALP elevated about 220, AST/ ALT normal , bilirubin is 1.6  U/s hepatic and pancreas shows hepatic fibrofatty changes, no dilatation, right sided pleural effusion  F/up on hepatic panel, direct and indirect bl  Monitor the LFts  HepB core antibody positive,  f/u HBe antigen

## 2017-08-28 NOTE — PROGRESS NOTE ADULT - PROBLEM SELECTOR PLAN 4
Urine culture and blood culture negative to date   however patient had episode of hematuria , ua postitive for blood   will get renal bladder usg  follow up legionella titres

## 2017-08-28 NOTE — DISCHARGE NOTE ADULT - NS AS DC STROKE ED MATERIALS
Call 911 for Stroke/Stroke Education Booklet/Prescribed Medications/Risk Factors for Stroke/Stroke Warning Signs and Symptoms/Need for Followup After Discharge

## 2017-08-28 NOTE — DISCHARGE NOTE ADULT - PLAN OF CARE
resolution of symptoms c/w home meds  Follow up with pmd in 1 week antibiotic course completed resolution of symptoms   discharge the patient with palumbo catheter ; patient must follow up with urologist outpatient in a week patient will be discharged on warfarin 3 mg dose ( home dose was 5 mg - patient had supratherapeutic inr in hospital )  Patient must follow up with pmd in a week for inr follow up c/w home meds c/w home meds  Follow up with pmd in 1 week  Lasix was discontinued  as patient is having hypotension as per cardio , Dr. Hairston

## 2017-08-28 NOTE — DISCHARGE NOTE ADULT - NS AS DC HF EDUCATION INSTRUCTIONS
Call Primary Care Provider for follow-up after discharge/Low salt diet/Report weight gain of 2 or more pounds in one day or 3 or more pounds in one week, worsening shortness of breath, fatigue, weakness, increased swelling of hands and feet to primary care provider/Monitor Weight Daily/Activities as tolerated

## 2017-08-28 NOTE — PROGRESS NOTE ADULT - PROBLEM SELECTOR PLAN 3
Patient has BPH, on Flomax at home, as per wife since last few months he has hesitancy, dribbling, difficulty in urine   patient c/o pain while urination today

## 2017-08-28 NOTE — DISCHARGE NOTE ADULT - MEDICATION SUMMARY - MEDICATIONS TO TAKE
I will START or STAY ON the medications listed below when I get home from the hospital:    aspirin 81 mg oral tablet, chewable  -- 1 tab(s) by mouth once a day  -- Indication: For Cardioprotective    losartan 50 mg oral tablet  -- 1 tab(s) by mouth once a day  -- Indication: For Htn    tamsulosin 0.4 mg oral capsule  -- 1 cap(s) by mouth once a day  -- Indication: For BPH (benign prostatic hyperplasia)    Coumadin 3 mg oral tablet  -- 1 tab(s) by mouth once a day  -- Do not take this drug if you are pregnant.  It is very important that you take or use this exactly as directed.  Do not skip doses or discontinue unless directed by your doctor.  Obtain medical advice before taking any non-prescription drugs as some may affect the action of this medication.    -- Indication: For CHronic afib    levETIRAcetam 1000 mg oral tablet  -- 1 tab(s) by mouth 2 times a day  -- Indication: For Seizure     citalopram 10 mg oral tablet  -- 1 tab(s) by mouth once a day  -- Indication: For depression     meclizine 12.5 mg oral tablet  -- 1 tab(s) by mouth 3 times a day, As Needed  -- Indication: For vertigo     atorvastatin 40 mg oral tablet  -- 1 tab(s) by mouth once a day (at bedtime)  -- Indication: For Hld    metoprolol tartrate 25 mg oral tablet  -- 1 tab(s) by mouth 2 times a day  -- Indication: For Htn    levothyroxine 25 mcg (0.025 mg) oral capsule  -- 0.5 cap(s) by mouth once a day  -- Check with your doctor before becoming pregnant.  Obtain medical advice before taking any non-prescription drugs as some may affect the action of this medication.  Take medication on an empty stomach 1 hour before or 2 to 3 hours after a meal unless otherwise directed by your doctor.    -- Indication: For Hypothyroidism     cyanocobalamin 500 mcg oral tablet  -- 1 tab(s) by mouth once a day  -- Indication: For vitamin deficiency     folic acid 1 mg oral tablet  -- 1 tab(s) by mouth once a day  -- Indication: For vitamin deficiency

## 2017-08-28 NOTE — PROGRESS NOTE ADULT - PROBLEM SELECTOR PLAN 2
SOB, lower extremity swelling (decreased )  CXR shows cardiomegaly with multifocal pneumonia and congestive change. Moderate right pleural effusion.   Probnp elevated to 4337 in ED  Mild Bilateral wheezing  on exam   S/p Lasix 20 x 2 in ED , patient has some difficulty in urinating , but he urinated well  C/w lasix 40 mg IV daily   Monitor I/Os, daily weights   f/up on ECHO : Calcified trileaflet aortic valve with normal  opening. Mean transaortic valve gradient equals 6 mm Hg,  consistent with normal aortic stenosis. Trace aortic  regurgitation.  Left Ventricle: Normal Left Ventricular Systolic Function,  (EF = 55 to 60%) Moderate concentric left ventricular  hypertrophy. Grade II diastolic dysfunction  c/w telemetry   EKG shows paced rhythm, wide QRS rhythm with premature complexes, QTC of 513 ms, no ST , T wave abnormality  T1 is slightly elevated to 0.041, f/up on T2 and t3 - negative   Has PPM ( last interrogated about 2 months ago as per wife) , [employer].  C/w aspirin, statin, beta blocker  Cardio consult Dr Hairston

## 2017-08-28 NOTE — PROGRESS NOTE ADULT - ASSESSMENT
pt had small amount of blood after foleys,  d/w pt s family thru translater ( marilyn COLBERT)  pts family is concern that pt has foleys after he has implant  so i texted uro for this concern.  afib on AC  inr slightly high will get inr in am.    diastolic dysfunction and mild pulmonary htn  out pt f/u.   cva, afib on coumadin

## 2017-08-28 NOTE — DISCHARGE NOTE ADULT - PATIENT PORTAL LINK FT
“You can access the [facility] Patient Portal, offered by [facility], by registering with the following website: [email or web address]”

## 2017-08-28 NOTE — DISCHARGE NOTE ADULT - NS AS DC FOLLOWUP STROKE INST
Pneumonia/Heart Failure Pneumonia/Heart Failure/Coumadin/Warfarin Coumadin/Warfarin/Stroke (includes: TIA/SAH/ICH/Ischemic Stroke)/Heart Failure/Pneumonia

## 2017-08-28 NOTE — PROGRESS NOTE ADULT - SUBJECTIVE AND OBJECTIVE BOX
PGY 1 Note discussed with supervising resident and primary attending    Patient is a 83y old  Male who presents with a chief complaint of SOB x 1 week (25 Aug 2017 15:31)      INTERVAL HPI/OVERNIGHT EVENTS: offers no new complaints; current symptoms resolving    MEDICATIONS  (STANDING):  furosemide   Injectable 40 milliGRAM(s) IV Push daily  ALBUTerol/ipratropium for Nebulization 3 milliLiter(s) Nebulizer every 6 hours  aspirin  chewable 81 milliGRAM(s) Oral daily  atorvastatin 40 milliGRAM(s) Oral at bedtime  metoprolol 12.5 milliGRAM(s) Oral two times a day  cefTRIAXone   IVPB 1 Gram(s) IV Intermittent every 24 hours  doxycycline hyclate Capsule 100 milliGRAM(s) Oral every 12 hours  tamsulosin 0.4 milliGRAM(s) Oral at bedtime  levETIRAcetam 1000 milliGRAM(s) Oral two times a day  citalopram 10 milliGRAM(s) Oral daily  losartan 25 milliGRAM(s) Oral daily  levothyroxine 12.5 MICROGram(s) Oral daily  cyanocobalamin 500 MICROGram(s) Oral daily  folic acid 1 milliGRAM(s) Oral daily    MEDICATIONS  (PRN):  acetaminophen   Tablet. 650 milliGRAM(s) Oral every 6 hours PRN Mild Pain (1 - 3)      __________________________________________________  REVIEW OF SYSTEMS: Patient has c/o dysuria , suprapubic pain     CONSTITUTIONAL: No fever,   EYES: no acute visual disturbances  NECK: No pain or stiffness  RESPIRATORY: No cough; No shortness of breath  CARDIOVASCULAR: No chest pain, no palpitations  GASTROINTESTINAL: suprapubic pain   NEUROLOGICAL: No headache or numbness, no tremors  MUSCULOSKELETAL: No joint pain, no muscle pain  GENITOURINARY: no dysuria, no frequency, no hesitancy  PSYCHIATRY: no depression , no anxiety  ALL OTHER  ROS negative        Vital Signs Last 24 Hrs  T(C): 37 (28 Aug 2017 11:19), Max: 37 (28 Aug 2017 11:19)  T(F): 98.6 (28 Aug 2017 11:19), Max: 98.6 (28 Aug 2017 11:19)  HR: 98 (28 Aug 2017 11:19) (63 - 98)  BP: 101/53 (28 Aug 2017 11:19) (101/53 - 119/74)  BP(mean): --  RR: 18 (28 Aug 2017 11:19) (17 - 20)  SpO2: 99% (28 Aug 2017 11:19) (99% - 100%)    ________________________________________________  PHYSICAL EXAM:  GENERAL: NAD  HEENT: Normocephalic;  conjunctivae and sclerae clear; moist mucous membranes;   NECK : supple  CHEST/LUNG: Clear to auscultation bilaterally with good air entry   HEART: S1 S2  regular; no murmurs, gallops or rubs  ABDOMEN: Soft, Nontender, Nondistended; Bowel sounds present; suprapubic tenderness   EXTREMITIES: no cyanosis; no edema; no calf tenderness  SKIN: warm and dry; no rash  NERVOUS SYSTEM:  Awake and alert; Oriented  to place, person and time ; no new deficits    _________________________________________________  LABS:                        11.1   9.9   )-----------( 132      ( 28 Aug 2017 06:52 )             32.4         132<L>  |  99  |  22<H>  ----------------------------<  105<H>  4.0   |  24  |  1.15    Ca    9.0      28 Aug 2017 06:52  Phos  3.0       Mg     1.8         TPro  7.0  /  Alb  3.4<L>  /  TBili  1.2  /  DBili  x   /  AST  50<H>  /  ALT  36  /  AlkPhos  174<H>      PT/INR - ( 28 Aug 2017 06:52 )   PT: 34.7 sec;   INR: 3.11 ratio           Urinalysis Basic - ( 28 Aug 2017 05:28 )    Color: Brown / Appearance: Turbid / S.025 / pH: x  Gluc: x / Ketone: Trace  / Bili: Small / Urobili: 4   Blood: x / Protein: 500 mg/dL / Nitrite: Positive   Leuk Esterase: Small / RBC: >50 /HPF / WBC 3-5 /HPF   Sq Epi: x / Non Sq Epi: Few / Bacteria: x      CAPILLARY BLOOD GLUCOSE            RADIOLOGY & ADDITIONAL TESTS:    Imaging Personally Reviewed:  YES/NO    Consultant(s) Notes Reviewed:   YES/ No    Care Discussed with Consultants :     Plan of care was discussed with patient and /or primary care giver; all questions and concerns were addressed and care was aligned with patient's wishes.

## 2017-08-28 NOTE — PROGRESS NOTE ADULT - PROBLEM SELECTOR PLAN 1
Patient still has sob , dry cough ; wheezing b/l  CXR on admission shows multifocal pneumonia, also congested   Got Levaquin by ED before sending blood cultures  On EKG Qtc is very prolonged to about 513 ms, no prior EKG to compare   continue with Rocephin and doxycycline  Give bronchodilators PRN , O2 supplemental PRN  F/up on blood cultures negative

## 2017-08-28 NOTE — DISCHARGE NOTE ADULT - MEDICATION SUMMARY - MEDICATIONS TO STOP TAKING
I will STOP taking the medications listed below when I get home from the hospital:    bisoprolol 5 mg oral tablet  -- 1 tab(s) by mouth once a day

## 2017-08-28 NOTE — DISCHARGE NOTE ADULT - HOSPITAL COURSE
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 History provided by the granddaughter and wife at the bedside. According to her patient usually feels SOB while he walks short distance uses 3- 4 pillows at night, associated with lower extremity swelling. He has PPM placed about 2 yrs ago as per family his heart not functioning fine.  He has accident in 2012, head surgery done , after that he has another stroke, placed on warfarin. Last INR checked about a month ago.  He also has dry cough. Denies fever, chills, chest pain, nausea, vomiting, sick contacts, recent travel, or any other complaints. His last visit to PMD about a week ago. Never had EGD and colonoscopy done.  In the ED vitals stable, labs penitent for Na of 133, BUN 23, Bl of 1.6, , T1 0.041, proBNP is 4337, ABG done shows mild respiratory alkalosis but no hypoxia, CXR shows pulmonary congestion, right sided pleural effusion multifocal pneumonia, CT angio chest negative for PE, got aspirin, Duoneb Lasix 20 x 2, EKG shows wide QRS rhythm with premature complexes, QTC of 513 ms, admitted to tele for ruling out ACS, CHF exacerbation, multifocal pneumonia.     Got Levaquin by ED before sending blood cultures  On EKG Qtc is very prolonged to about 513 ms, no prior EKG to compare   Will give the Rocephin and doxycycline, as cannot give macrolide due to qtc prolong-  patient completed 6 days of antibiotics in the hospital .   Give bronchodilators PRN , O2 supplemental PRN  F/up on blood cultures- negative     Patient has CHF exacerbation.  Plan: SOB, lower extremity swelling   On admission : CXR shows cardiomegaly with multifocal pneumonia and congestive change. Moderate right pleural effusion.   Probnp elevated to 4337  Mild Bilateral crackles on exam   S/p Lasix 20 x 2 in ED , patient has some difficulty in urinating , but he urinated well  C/w lasix 40 mg IV daily - switcted to po lasix 40 mg daily during the course of admission   Monitor I/Os, daily weights    ECHO done : patient has EF of 55-60% ; grade 2 diastolic dysfunction   No chest pain   EKG shows paced rhythm, wide QRS rhythm with premature complexes, QTC of 513 ms, no ST , T wave abnormality  T1 is slightly elevated to 0.041, f/up on T2 and t3 - negative   Has PPM ( last interrogated about 2 months ago as per wife) , [place].  C/w aspirin, statin, beta blocker  Cardio consult Dr Hairston.   Patient has some episodes of V. Tach on tele - Patient is asymptomatic , with a normal EF ; hence will be discharged on a increased lopressor dose.   At the time of discharge : patient is not in exacerbation anymore ; cxr cleared for fluid collection ; also patient is not sob , saturating 97 % on room air . no cough . no chest pain     Patient has   BPH (benign prostatic hyperplasia).  Plan: Patient has BPH, on Flomax at home, as per wife since last few months he has hesitancy, dribbling, difficulty in urination   In the Ed patient urinating well  Mild suprapubic tenderness noted on exam , patient complained of some dysuria , relieved by pain medication   Will do the bladder scan to check the post void residual : Patient also had episodes of hematuria ; Urology was consulted ; Collins catheterization was done , as per urology recommendation , Dr. Sosa , Patient will  be discharged on Collins and must follow up with urology outpatient .   C/w Flomax for now.     Patient has Elevated LFTs.  Plan: ALP elevated about 220, AST/ ALT normal , bilirubin is 1.6  U/s hepatic and pancreas shows hepatic fibrofatty changes, no dilatation, right sided pleural effusion  F/up on hepatic panel, direct and indirect bl:wnl   Monitored the LFts.     PAtient has h/o Seizure.  Plan: C/w home meds keppra 1000 mg BID  F/up on levels.     Patient has h/o CVA (cerebral vascular accident). Plan: As per patient he takes warfarin at home 5 mg , placed about 2 yrs ago, last INR checked about 1 month ago.  F/up on INR, home dose is 5 mg coumadin, was given 3 mg in Ed. However , INR is supratherapeutic . So warfarin was stopped . Inr monitored . Once in range , warfarin was restarted 3 mg . Patient was advised to follow up with pmd in a week for follow up on inr .      Hypertension.  Plan was to C/w home does of losartan 50 mg, metoprolol 25 bid   DASH/TLC diet.     Need for prophylactic measure.  Plan: Improve score is 1, 3 month risk is 0.4, c/w coumadin for DVT ppx  No need for GI px.     Patient is medically clear for discharge . 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 History provided by the granddaughter and wife at the bedside. According to her patient usually feels SOB while he walks short distance uses 3- 4 pillows at night, associated with lower extremity swelling. He has PPM placed about 2 yrs ago as per family his heart not functioning fine.  He has accident in 2012, head surgery done , after that he has another stroke, placed on warfarin. Last INR checked about a month ago.  He also has dry cough. Denies fever, chills, chest pain, nausea, vomiting, sick contacts, recent travel, or any other complaints. His last visit to PMD about a week ago. Never had EGD and colonoscopy done.  In the ED vitals stable, labs penitent for Na of 133, BUN 23, Bl of 1.6, , T1 0.041, proBNP is 4337, ABG done shows mild respiratory alkalosis but no hypoxia, CXR shows pulmonary congestion, right sided pleural effusion multifocal pneumonia, CT angio chest negative for PE, got aspirin, Duoneb Lasix 20 x 2, EKG shows wide QRS rhythm with premature complexes, QTC of 513 ms, admitted to tele for ruling out ACS, CHF exacerbation, multifocal pneumonia.     Got Levaquin by ED before sending blood cultures  On EKG Qtc is very prolonged to about 513 ms, no prior EKG to compare   Will give the Rocephin and doxycycline, as cannot give macrolide due to qtc prolong-  patient completed 6 days of antibiotics in the hospital .   Give bronchodilators PRN , O2 supplemental PRN  F/up on blood cultures- negative     Patient has CHF exacerbation.  Plan: SOB, lower extremity swelling   On admission : CXR shows cardiomegaly with multifocal pneumonia and congestive change. Moderate right pleural effusion.   Probnp elevated to 4337  Mild Bilateral crackles on exam   S/p Lasix 20 x 2 in ED , patient has some difficulty in urinating , but he urinated well  C/w lasix 40 mg IV daily - switcted to po lasix 40 mg daily during the course of admission   Monitor I/Os, daily weights    ECHO done : patient has EF of 55-60% ; grade 2 diastolic dysfunction   No chest pain   EKG shows paced rhythm, wide QRS rhythm with premature complexes, QTC of 513 ms, no ST , T wave abnormality  T1 is slightly elevated to 0.041, f/up on T2 and t3 - negative   Has PPM ( last interrogated about 2 months ago as per wife) , [place].  C/w aspirin, statin, beta blocker  Cardio consult Dr Hairston.   Patient has some episodes of V. Tach on tele - Patient is asymptomatic , with a normal EF ; hence will be discharged on a increased lopressor dose.   At the time of discharge : patient is not in exacerbation anymore ; cxr cleared for fluid collection ; also patient is not sob , saturating 97 % on room air . no cough . no chest pain     Patient has   BPH (benign prostatic hyperplasia).  Plan: Patient has BPH, on Flomax at home, as per wife since last few months he has hesitancy, dribbling, difficulty in urination   In the Ed patient urinating well  Mild suprapubic tenderness noted on exam , patient complained of some dysuria , relieved by pain medication   Will do the bladder scan to check the post void residual : Patient also had episodes of hematuria ; Urology was consulted ; Collins catheterization was done , as per urology recommendation , Dr. Sosa , Patient will  be discharged on Collins and must follow up with urology outpatient .   C/w Flomax for now.     Patient has Elevated LFTs.  Plan: ALP elevated about 220, AST/ ALT normal , bilirubin is 1.6  U/s hepatic and pancreas shows hepatic fibrofatty changes, no dilatation, right sided pleural effusion  F/up on hepatic panel, direct and indirect bl:wnl   Monitored the LFts.     PAtient has h/o Seizure.  Plan: C/w home meds keppra 1000 mg BID  F/up on levels.     Patient has h/o CVA (cerebral vascular accident). Plan: As per patient he takes warfarin at home 5 mg , placed about 2 yrs ago, last INR checked about 1 month ago.  F/up on INR, home dose is 5 mg coumadin, was given 3 mg in Ed. However , INR is supratherapeutic . So warfarin was stopped . Inr monitored . Once in range , warfarin was restarted 3 mg . Patient was advised to follow up with pmd in a week for follow up on inr .      Hypertension.  Plan was to C/w home does of losartan 50 mg, metoprolol 25 bid   DASH/TLC diet.     Need for prophylactic measure.  Plan: Improve score is 1, 3 month risk is 0.4, c/w coumadin for DVT ppx  No need for GI px.     Patient was re-evaluated by PT - recommended [facility] . Patient will be given options and family was working on case management to finalize the rehab options .   Patient is medically clear for discharge . 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 History provided by the granddaughter and wife at the bedside. According to her patient usually feels SOB while he walks short distance uses 3- 4 pillows at night, associated with lower extremity swelling. He has PPM placed about 2 yrs ago as per family his heart not functioning fine.  He has accident in 2012, head surgery done , after that he has another stroke, placed on warfarin. Last INR checked about a month ago.  He also has dry cough. Denies fever, chills, chest pain, nausea, vomiting, sick contacts, recent travel, or any other complaints. His last visit to PMD about a week ago. Never had EGD and colonoscopy done.  In the ED vitals stable, labs penitent for Na of 133, BUN 23, Bl of 1.6, , T1 0.041, proBNP is 4337, ABG done shows mild respiratory alkalosis but no hypoxia, CXR shows pulmonary congestion, right sided pleural effusion multifocal pneumonia, CT angio chest negative for PE, got aspirin, Duoneb Lasix 20 x 2, EKG shows wide QRS rhythm with premature complexes, QTC of 513 ms, admitted to tele for ruling out ACS, CHF exacerbation, multifocal pneumonia.     Got Levaquin by ED before sending blood cultures  On EKG Qtc is very prolonged to about 513 ms, no prior EKG to compare   Will give the Rocephin and doxycycline, as cannot give macrolide due to qtc prolong-  patient completed 6 days of antibiotics in the hospital .   Give bronchodilators PRN , O2 supplemental PRN  F/up on blood cultures- negative     Patient has CHF exacerbation.  Plan: SOB, lower extremity swelling   On admission : CXR shows cardiomegaly with multifocal pneumonia and congestive change. Moderate right pleural effusion.   Probnp elevated to 4337  Mild Bilateral crackles on exam   S/p Lasix 20 x 2 in ED , patient has some difficulty in urinating , but he urinated well  C/w lasix 40 mg IV daily - switcted to po lasix 40 mg daily during the course of admission   Monitor I/Os, daily weights    ECHO done : patient has EF of 55-60% ; grade 2 diastolic dysfunction   No chest pain   EKG shows paced rhythm, wide QRS rhythm with premature complexes, QTC of 513 ms, no ST , T wave abnormality  T1 is slightly elevated to 0.041, f/up on T2 and t3 - negative   Has PPM ( last interrogated about 2 months ago as per wife) , [employer].  C/w aspirin, statin, beta blocker  Cardio consult Dr Hairston.   Patient has some episodes of V. Tach on tele - Patient is asymptomatic , with a normal EF ; hence will be discharged on a increased lopressor dose.   At the time of discharge : patient is not in exacerbation anymore ; cxr cleared for fluid collection ; also patient is not sob , saturating 97 % on room air . no cough . no chest pain     Patient has   BPH (benign prostatic hyperplasia).  Plan: Patient has BPH, on Flomax at home, as per wife since last few months he has hesitancy, dribbling, difficulty in urination   In the Ed patient urinating well  Mild suprapubic tenderness noted on exam , patient complained of some dysuria , relieved by pain medication   Will do the bladder scan to check the post void residual : Patient also had episodes of hematuria ; Urology was consulted ; Collins catheterization was done , as per urology recommendation , Dr. Sosa , Patient will  be discharged on Collins and must follow up with urology outpatient .   C/w Flomax for now.     Patient has Elevated LFTs.  Plan: ALP elevated about 220, AST/ ALT normal , bilirubin is 1.6  U/s hepatic and pancreas shows hepatic fibrofatty changes, no dilatation, right sided pleural effusion  F/up on hepatic panel, direct and indirect bl:wnl   Monitored the LFts.     PAtient has h/o Seizure.  Plan: C/w home meds keppra 1000 mg BID    Patient has h/o CVA (cerebral vascular accident). Plan: As per patient he takes warfarin at home 5 mg , placed about 2 yrs ago, last INR checked about 1 month ago.  F/up on INR, home dose is 5 mg coumadin, was given 3 mg in Ed. However , INR is supratherapeutic . So warfarin was stopped . Inr monitored . Once in range , warfarin was restarted 3 mg . Patient was advised to follow up with pmd in a week for follow up on inr .      Patient has Hypertension.  Plan was to C/w home does of losartan 50 mg, metoprolol 25 bid   DASH/TLC diet.     Need for prophylactic measure.  Plan: Improve score is 1, 3 month risk is 0.4, c/w coumadin for DVT ppx  No need for GI px.     Patient was re-evaluated by PT - recommended [facility] . Patient is being accepted at [facility] .   Patient is medically clear for discharge .

## 2017-08-28 NOTE — DISCHARGE NOTE ADULT - CARE PLAN
Principal Discharge DX:	CHF exacerbation  Goal:	resolution of symptoms  Instructions for follow-up, activity and diet:	c/w home meds  Follow up with pmd in 1 week  Secondary Diagnosis:	Multifocal pneumonia  Instructions for follow-up, activity and diet:	antibiotic course completed  Secondary Diagnosis:	UTI (urinary tract infection)  Instructions for follow-up, activity and diet:	resolution of symptoms   discharge the patient with palumbo catheter ; patient must follow up with urologist outpatient in a week  Secondary Diagnosis:	Seizure  Instructions for follow-up, activity and diet:	c/w home meds  Secondary Diagnosis:	Chronic a-fib  Instructions for follow-up, activity and diet:	patient will be discharged on warfarin 3 mg dose ( home dose was 5 mg - patient had supratherapeutic inr in hospital )  Patient must follow up with pmd in a week for inr follow up Principal Discharge DX:	CHF exacerbation  Goal:	resolution of symptoms  Instructions for follow-up, activity and diet:	c/w home meds  Follow up with pmd in 1 week  Lasix was discontinued  as patient is having hypotension as per cardio , Dr. Hairston  Secondary Diagnosis:	Multifocal pneumonia  Instructions for follow-up, activity and diet:	antibiotic course completed  Secondary Diagnosis:	UTI (urinary tract infection)  Instructions for follow-up, activity and diet:	resolution of symptoms   discharge the patient with palumbo catheter ; patient must follow up with urologist outpatient in a week  Secondary Diagnosis:	Seizure  Instructions for follow-up, activity and diet:	c/w home meds  Secondary Diagnosis:	Chronic a-fib  Instructions for follow-up, activity and diet:	patient will be discharged on warfarin 3 mg dose ( home dose was 5 mg - patient had supratherapeutic inr in hospital )  Patient must follow up with pmd in a week for inr follow up

## 2017-08-28 NOTE — CHART NOTE - NSCHARTNOTEFT_GEN_A_CORE
Paged by nurse, Patient has bloody urine, Patient assessed at bedside. NAD.  As per nurse, Patient have hematuria since yesterday after urologist put palumbo catheter. But the urine was clear. Noticed Bloody urine. Could be due to UTI. Will observe for now

## 2017-08-29 LAB
ANION GAP SERPL CALC-SCNC: 10 MMOL/L — SIGNIFICANT CHANGE UP (ref 5–17)
BUN SERPL-MCNC: 16 MG/DL — SIGNIFICANT CHANGE UP (ref 7–18)
CALCIUM SERPL-MCNC: 9.5 MG/DL — SIGNIFICANT CHANGE UP (ref 8.4–10.5)
CHLORIDE SERPL-SCNC: 100 MMOL/L — SIGNIFICANT CHANGE UP (ref 96–108)
CO2 SERPL-SCNC: 24 MMOL/L — SIGNIFICANT CHANGE UP (ref 22–31)
CREAT SERPL-MCNC: 1.15 MG/DL — SIGNIFICANT CHANGE UP (ref 0.5–1.3)
GLUCOSE SERPL-MCNC: 94 MG/DL — SIGNIFICANT CHANGE UP (ref 70–99)
HCT VFR BLD CALC: 36 % — LOW (ref 39–50)
HGB BLD-MCNC: 12.2 G/DL — LOW (ref 13–17)
INR BLD: 2.23 RATIO — HIGH (ref 0.88–1.16)
MAGNESIUM SERPL-MCNC: 2 MG/DL — SIGNIFICANT CHANGE UP (ref 1.6–2.6)
MCHC RBC-ENTMCNC: 29.9 PG — SIGNIFICANT CHANGE UP (ref 27–34)
MCHC RBC-ENTMCNC: 33.9 GM/DL — SIGNIFICANT CHANGE UP (ref 32–36)
MCV RBC AUTO: 88.3 FL — SIGNIFICANT CHANGE UP (ref 80–100)
PHOSPHATE SERPL-MCNC: 2.5 MG/DL — SIGNIFICANT CHANGE UP (ref 2.5–4.5)
PLATELET # BLD AUTO: 147 K/UL — LOW (ref 150–400)
POTASSIUM SERPL-MCNC: 4.1 MMOL/L — SIGNIFICANT CHANGE UP (ref 3.5–5.3)
POTASSIUM SERPL-SCNC: 4.1 MMOL/L — SIGNIFICANT CHANGE UP (ref 3.5–5.3)
PROTHROM AB SERPL-ACNC: 24.7 SEC — HIGH (ref 9.8–12.7)
RBC # BLD: 4.08 M/UL — LOW (ref 4.2–5.8)
RBC # FLD: 13.3 % — SIGNIFICANT CHANGE UP (ref 10.3–14.5)
SODIUM SERPL-SCNC: 134 MMOL/L — LOW (ref 135–145)
WBC # BLD: 8.6 K/UL — SIGNIFICANT CHANGE UP (ref 3.8–10.5)
WBC # FLD AUTO: 8.6 K/UL — SIGNIFICANT CHANGE UP (ref 3.8–10.5)

## 2017-08-29 PROCEDURE — 71010: CPT | Mod: 26

## 2017-08-29 RX ORDER — METOPROLOL TARTRATE 50 MG
25 TABLET ORAL
Qty: 0 | Refills: 0 | Status: DISCONTINUED | OUTPATIENT
Start: 2017-08-29 | End: 2017-09-01

## 2017-08-29 RX ORDER — FUROSEMIDE 40 MG
40 TABLET ORAL DAILY
Qty: 0 | Refills: 0 | Status: DISCONTINUED | OUTPATIENT
Start: 2017-08-29 | End: 2017-08-29

## 2017-08-29 RX ORDER — WARFARIN SODIUM 2.5 MG/1
2 TABLET ORAL DAILY
Qty: 0 | Refills: 0 | Status: COMPLETED | OUTPATIENT
Start: 2017-08-29 | End: 2017-08-29

## 2017-08-29 RX ORDER — METOPROLOL TARTRATE 50 MG
25 TABLET ORAL
Qty: 0 | Refills: 0 | Status: DISCONTINUED | OUTPATIENT
Start: 2017-08-29 | End: 2017-08-29

## 2017-08-29 RX ADMIN — Medication 3 MILLILITER(S): at 14:16

## 2017-08-29 RX ADMIN — Medication 100 MILLIGRAM(S): at 17:37

## 2017-08-29 RX ADMIN — LOSARTAN POTASSIUM 25 MILLIGRAM(S): 100 TABLET, FILM COATED ORAL at 06:09

## 2017-08-29 RX ADMIN — PREGABALIN 500 MICROGRAM(S): 225 CAPSULE ORAL at 12:34

## 2017-08-29 RX ADMIN — TAMSULOSIN HYDROCHLORIDE 0.4 MILLIGRAM(S): 0.4 CAPSULE ORAL at 21:19

## 2017-08-29 RX ADMIN — Medication 40 MILLIGRAM(S): at 06:09

## 2017-08-29 RX ADMIN — Medication 12.5 MICROGRAM(S): at 06:09

## 2017-08-29 RX ADMIN — Medication 81 MILLIGRAM(S): at 12:34

## 2017-08-29 RX ADMIN — Medication 1 MILLIGRAM(S): at 12:34

## 2017-08-29 RX ADMIN — CEFTRIAXONE 100 GRAM(S): 500 INJECTION, POWDER, FOR SOLUTION INTRAMUSCULAR; INTRAVENOUS at 21:20

## 2017-08-29 RX ADMIN — WARFARIN SODIUM 2 MILLIGRAM(S): 2.5 TABLET ORAL at 21:19

## 2017-08-29 RX ADMIN — CITALOPRAM 10 MILLIGRAM(S): 10 TABLET, FILM COATED ORAL at 12:34

## 2017-08-29 RX ADMIN — Medication 100 MILLIGRAM(S): at 06:09

## 2017-08-29 RX ADMIN — ATORVASTATIN CALCIUM 40 MILLIGRAM(S): 80 TABLET, FILM COATED ORAL at 21:19

## 2017-08-29 RX ADMIN — Medication 25 MILLIGRAM(S): at 17:37

## 2017-08-29 RX ADMIN — LEVETIRACETAM 1000 MILLIGRAM(S): 250 TABLET, FILM COATED ORAL at 17:37

## 2017-08-29 RX ADMIN — Medication 12.5 MILLIGRAM(S): at 06:08

## 2017-08-29 RX ADMIN — Medication 3 MILLILITER(S): at 08:15

## 2017-08-29 RX ADMIN — LEVETIRACETAM 1000 MILLIGRAM(S): 250 TABLET, FILM COATED ORAL at 06:08

## 2017-08-29 NOTE — PROGRESS NOTE ADULT - PROBLEM SELECTOR PLAN 4
Urine culture and blood culture negative to date   however patient had episode of hematuria , ua postitive for blood yesterday ; nothing after that   will get renal bladder usg  follow up legionella titres

## 2017-08-29 NOTE — PROGRESS NOTE ADULT - SUBJECTIVE AND OBJECTIVE BOX
PGY 1 Note discussed with supervising resident and primary attending    Patient is a 83y old  Male who presents with a chief complaint of SOB x 1 week (28 Aug 2017 16:07)      INTERVAL HPI/OVERNIGHT EVENTS: offers no new complaints; current symptoms resolving    MEDICATIONS  (STANDING):  ALBUTerol/ipratropium for Nebulization 3 milliLiter(s) Nebulizer every 6 hours  aspirin  chewable 81 milliGRAM(s) Oral daily  atorvastatin 40 milliGRAM(s) Oral at bedtime  cefTRIAXone   IVPB 1 Gram(s) IV Intermittent every 24 hours  doxycycline hyclate Capsule 100 milliGRAM(s) Oral every 12 hours  tamsulosin 0.4 milliGRAM(s) Oral at bedtime  levETIRAcetam 1000 milliGRAM(s) Oral two times a day  citalopram 10 milliGRAM(s) Oral daily  losartan 25 milliGRAM(s) Oral daily  levothyroxine 12.5 MICROGram(s) Oral daily  cyanocobalamin 500 MICROGram(s) Oral daily  folic acid 1 milliGRAM(s) Oral daily  metoprolol 25 milliGRAM(s) Oral two times a day    MEDICATIONS  (PRN):  acetaminophen   Tablet. 650 milliGRAM(s) Oral every 6 hours PRN Mild Pain (1 - 3)      __________________________________________________  REVIEW OF SYSTEMS: no hematuria now     CONSTITUTIONAL: No fever,   EYES: no acute visual disturbances  NECK: No pain or stiffness  RESPIRATORY: No cough; No shortness of breath  CARDIOVASCULAR: No chest pain, no palpitations  GASTROINTESTINAL: No pain. No nausea or vomiting; No diarrhea   NEUROLOGICAL: No headache or numbness, no tremors  MUSCULOSKELETAL: No joint pain, no muscle pain  GENITOURINARY: no dysuria, no frequency, no hesitancy  PSYCHIATRY: no depression , no anxiety  ALL OTHER  ROS negative        Vital Signs Last 24 Hrs  T(C): 37 (29 Aug 2017 11:43), Max: 37 (29 Aug 2017 00:16)  T(F): 98.6 (29 Aug 2017 11:43), Max: 98.6 (29 Aug 2017 00:16)  HR: 61 (29 Aug 2017 12:15) (48 - 71)  BP: 87/50 (29 Aug 2017 12:15) (85/49 - 119/70)  BP(mean): --  RR: 16 (29 Aug 2017 11:43) (16 - 18)  SpO2: 98% (29 Aug 2017 11:43) (97% - 100%)    ________________________________________________  PHYSICAL EXAM:   GENERAL: NAD  HEENT: Normocephalic;  conjunctivae and sclerae clear; moist mucous membranes;   NECK : supple  CHEST/LUNG: very mild crackles in right lower lobe   HEART: S1 S2  regular; no murmurs, gallops or rubs  ABDOMEN: Soft, Nontender, Nondistended; Bowel sounds present  EXTREMITIES: no cyanosis; no edema; no calf tenderness ; pedal edema 1+  SKIN: warm and dry; no rash  NERVOUS SYSTEM:  Awake and alert; Oriented  to place, person and time ; no new deficits    _________________________________________________  LABS:                        12.2   8.6   )-----------( 147      ( 29 Aug 2017 07:38 )             36.0         134<L>  |  100  |  16  ----------------------------<  94  4.1   |  24  |  1.15    Ca    9.5      29 Aug 2017 07:38  Phos  2.5       Mg     2.0         TPro  7.0  /  Alb  3.4<L>  /  TBili  1.2  /  DBili  x   /  AST  50<H>  /  ALT  36  /  AlkPhos  174<H>      PT/INR - ( 29 Aug 2017 10:36 )   PT: 24.7 sec;   INR: 2.23 ratio           Urinalysis Basic - ( 28 Aug 2017 05:28 )    Color: Brown / Appearance: Turbid / S.025 / pH: x  Gluc: x / Ketone: Trace  / Bili: Small / Urobili: 4   Blood: x / Protein: 500 mg/dL / Nitrite: Positive   Leuk Esterase: Small / RBC: >50 /HPF / WBC 3-5 /HPF   Sq Epi: x / Non Sq Epi: Few / Bacteria: x      CAPILLARY BLOOD GLUCOSE            RADIOLOGY & ADDITIONAL TESTS:    Imaging Personally Reviewed:  YES/NO    Consultant(s) Notes Reviewed:   YES/ No    Care Discussed with Consultants :     Plan of care was discussed with patient and /or primary care giver; all questions and concerns were addressed and care was aligned with patient's wishes.

## 2017-08-29 NOTE — PROGRESS NOTE ADULT - PROBLEM SELECTOR PLAN 3
Patient has BPH, on Flomax at home, as per wife since last few months he has hesitancy, dribbling, difficulty in urine   c/w flomax

## 2017-08-29 NOTE — PROGRESS NOTE ADULT - PROBLEM SELECTOR PLAN 1
patient doesn't have cough anymore ; no sob   F/up on blood cultures negative  Will continue with rocephin and doxycycline ( day 5 )

## 2017-08-29 NOTE — PROGRESS NOTE ADULT - PROBLEM SELECTOR PLAN 5
ALP elevated about 174 , AST/ ALT normal , bilirubin is 1.6  U/s hepatic and pancreas shows hepatic fibrofatty changes, no dilatation, right sided pleural effusion  F/up on hepatic panel, direct and indirect bl  Monitor the LFts  HepB core antibody positive,  f/u HBe antigen

## 2017-08-29 NOTE — PROGRESS NOTE ADULT - PROBLEM SELECTOR PLAN 2
no SOB, lower extremity swelling (decreased )  CXR shows cardiomegaly with multifocal pneumonia and congestive change. Moderate right pleural effusion. on admission   Probnp elevated to 4337 in ED  crackles - very mild in right lower lobe   Patient was started on lasix 4o mg po daily , however BP is 85/45 . will hold lasix for now   Monitor I/Os, daily weights   f/up on ECHO : Calcified trileaflet aortic valve with normal  opening. Mean transaortic valve gradient equals 6 mm Hg,  consistent with normal aortic stenosis. Trace aortic  regurgitation.  Left Ventricle: Normal Left Ventricular Systolic Function,  (EF = 55 to 60%) Moderate concentric left ventricular  hypertrophy. Grade II diastolic dysfunction  c/w telemetry   EKG shows paced rhythm, wide QRS rhythm with premature complexes, QTC of 513 ms, no ST , T wave abnormality  T1 is slightly elevated to 0.041, f/up on T2 and t3 - negative   Has PPM ( last interrogated about 2 months ago as per wife) , [place] [employer].  C/w aspirin, statin, beta blocker  Cardio consult Dr Hairston  Patient has persistent episodes of tachycardia on tele ; will increase metoprolol to 25 bid with parameters

## 2017-08-29 NOTE — PROGRESS NOTE ADULT - ATTENDING COMMENTS
Thank you for the courtesy of the consultation,I would be available for any further discussion if needed.  Cheikh Hairston MD,FACC.  [address] [place], [place] [id number] [phone]
agree with above   d/w urologist, who spoke to pts pvt urologist that pt can be d/cd with foleys.

## 2017-08-30 LAB
ANION GAP SERPL CALC-SCNC: 11 MMOL/L — SIGNIFICANT CHANGE UP (ref 5–17)
BUN SERPL-MCNC: 16 MG/DL — SIGNIFICANT CHANGE UP (ref 7–18)
CALCIUM SERPL-MCNC: 9.5 MG/DL — SIGNIFICANT CHANGE UP (ref 8.4–10.5)
CHLORIDE SERPL-SCNC: 98 MMOL/L — SIGNIFICANT CHANGE UP (ref 96–108)
CO2 SERPL-SCNC: 26 MMOL/L — SIGNIFICANT CHANGE UP (ref 22–31)
CREAT SERPL-MCNC: 1.23 MG/DL — SIGNIFICANT CHANGE UP (ref 0.5–1.3)
GLUCOSE SERPL-MCNC: 92 MG/DL — SIGNIFICANT CHANGE UP (ref 70–99)
HCT VFR BLD CALC: 37.3 % — LOW (ref 39–50)
HEV AB FLD QL: POSITIVE
HEV IGM SER QL: SIGNIFICANT CHANGE UP
HGB BLD-MCNC: 12.4 G/DL — LOW (ref 13–17)
INR BLD: 1.67 RATIO — HIGH (ref 0.88–1.16)
INR BLD: 1.71 RATIO — HIGH (ref 0.88–1.16)
MCHC RBC-ENTMCNC: 29.6 PG — SIGNIFICANT CHANGE UP (ref 27–34)
MCHC RBC-ENTMCNC: 33.3 GM/DL — SIGNIFICANT CHANGE UP (ref 32–36)
MCV RBC AUTO: 88.6 FL — SIGNIFICANT CHANGE UP (ref 80–100)
PLATELET # BLD AUTO: 155 K/UL — SIGNIFICANT CHANGE UP (ref 150–400)
POTASSIUM SERPL-MCNC: 3.4 MMOL/L — LOW (ref 3.5–5.3)
POTASSIUM SERPL-SCNC: 3.4 MMOL/L — LOW (ref 3.5–5.3)
PROTHROM AB SERPL-ACNC: 18.4 SEC — HIGH (ref 9.8–12.7)
PROTHROM AB SERPL-ACNC: 18.8 SEC — HIGH (ref 9.8–12.7)
RBC # BLD: 4.21 M/UL — SIGNIFICANT CHANGE UP (ref 4.2–5.8)
RBC # FLD: 13 % — SIGNIFICANT CHANGE UP (ref 10.3–14.5)
SODIUM SERPL-SCNC: 135 MMOL/L — SIGNIFICANT CHANGE UP (ref 135–145)
WBC # BLD: 7.2 K/UL — SIGNIFICANT CHANGE UP (ref 3.8–10.5)
WBC # FLD AUTO: 7.2 K/UL — SIGNIFICANT CHANGE UP (ref 3.8–10.5)

## 2017-08-30 RX ORDER — LEVOTHYROXINE SODIUM 125 MCG
0.5 TABLET ORAL
Qty: 15 | Refills: 0 | OUTPATIENT
Start: 2017-08-30 | End: 2017-09-29

## 2017-08-30 RX ORDER — POTASSIUM CHLORIDE 20 MEQ
40 PACKET (EA) ORAL EVERY 4 HOURS
Qty: 0 | Refills: 0 | Status: COMPLETED | OUTPATIENT
Start: 2017-08-30 | End: 2017-08-30

## 2017-08-30 RX ORDER — FOLIC ACID 0.8 MG
1 TABLET ORAL
Qty: 30 | Refills: 0 | OUTPATIENT
Start: 2017-08-30 | End: 2017-09-29

## 2017-08-30 RX ORDER — METOPROLOL TARTRATE 50 MG
1 TABLET ORAL
Qty: 60 | Refills: 0 | OUTPATIENT
Start: 2017-08-30 | End: 2017-09-29

## 2017-08-30 RX ORDER — WARFARIN SODIUM 2.5 MG/1
3 TABLET ORAL ONCE
Qty: 0 | Refills: 0 | Status: COMPLETED | OUTPATIENT
Start: 2017-08-30 | End: 2017-08-30

## 2017-08-30 RX ORDER — ATORVASTATIN CALCIUM 80 MG/1
1 TABLET, FILM COATED ORAL
Qty: 30 | Refills: 0 | OUTPATIENT
Start: 2017-08-30 | End: 2017-08-31

## 2017-08-30 RX ORDER — PREGABALIN 225 MG/1
1 CAPSULE ORAL
Qty: 30 | Refills: 0 | OUTPATIENT
Start: 2017-08-30 | End: 2017-09-29

## 2017-08-30 RX ORDER — WARFARIN SODIUM 2.5 MG/1
1 TABLET ORAL
Qty: 15 | Refills: 0 | OUTPATIENT
Start: 2017-08-30

## 2017-08-30 RX ORDER — ASPIRIN/CALCIUM CARB/MAGNESIUM 324 MG
1 TABLET ORAL
Qty: 30 | Refills: 0 | OUTPATIENT
Start: 2017-08-30 | End: 2017-09-29

## 2017-08-30 RX ORDER — WARFARIN SODIUM 2.5 MG/1
1 TABLET ORAL
Qty: 0 | Refills: 0 | COMMUNITY

## 2017-08-30 RX ORDER — BISOPROLOL FUMARATE 10 MG/1
1 TABLET, FILM COATED ORAL
Qty: 0 | Refills: 0 | COMMUNITY

## 2017-08-30 RX ADMIN — LEVETIRACETAM 1000 MILLIGRAM(S): 250 TABLET, FILM COATED ORAL at 18:11

## 2017-08-30 RX ADMIN — Medication 100 MILLIGRAM(S): at 18:11

## 2017-08-30 RX ADMIN — Medication 40 MILLIEQUIVALENT(S): at 15:51

## 2017-08-30 RX ADMIN — PREGABALIN 500 MICROGRAM(S): 225 CAPSULE ORAL at 11:44

## 2017-08-30 RX ADMIN — CITALOPRAM 10 MILLIGRAM(S): 10 TABLET, FILM COATED ORAL at 11:44

## 2017-08-30 RX ADMIN — Medication 40 MILLIEQUIVALENT(S): at 11:44

## 2017-08-30 RX ADMIN — Medication 100 MILLIGRAM(S): at 05:42

## 2017-08-30 RX ADMIN — Medication 12.5 MICROGRAM(S): at 05:42

## 2017-08-30 RX ADMIN — Medication 3 MILLILITER(S): at 21:51

## 2017-08-30 RX ADMIN — Medication 3 MILLILITER(S): at 15:11

## 2017-08-30 RX ADMIN — Medication 25 MILLIGRAM(S): at 05:42

## 2017-08-30 RX ADMIN — ATORVASTATIN CALCIUM 40 MILLIGRAM(S): 80 TABLET, FILM COATED ORAL at 21:35

## 2017-08-30 RX ADMIN — WARFARIN SODIUM 3 MILLIGRAM(S): 2.5 TABLET ORAL at 21:35

## 2017-08-30 RX ADMIN — LEVETIRACETAM 1000 MILLIGRAM(S): 250 TABLET, FILM COATED ORAL at 05:42

## 2017-08-30 RX ADMIN — CEFTRIAXONE 100 GRAM(S): 500 INJECTION, POWDER, FOR SOLUTION INTRAMUSCULAR; INTRAVENOUS at 22:29

## 2017-08-30 RX ADMIN — Medication 3 MILLILITER(S): at 08:54

## 2017-08-30 RX ADMIN — Medication 81 MILLIGRAM(S): at 11:45

## 2017-08-30 RX ADMIN — Medication 1 MILLIGRAM(S): at 11:44

## 2017-08-30 RX ADMIN — Medication 25 MILLIGRAM(S): at 18:11

## 2017-08-30 RX ADMIN — TAMSULOSIN HYDROCHLORIDE 0.4 MILLIGRAM(S): 0.4 CAPSULE ORAL at 21:35

## 2017-08-30 NOTE — PROGRESS NOTE ADULT - ASSESSMENT
In the ED vitals stable, labs penitent for Na of 133, BUN 23, Bl of 1.6, , T1 0.041, proBNP is 4337, ABG done shows mild respiratory alkalosis but no hypoxia, CXR shows pulmonary congestion, right sided pleural effusion multifocal pneumonia, CT angio chest negative for PE, got aspirin, Duoneb Lasix 20 x 2, EKG shows wide QRS rhythm with premature complexes, QTC of 513 ms, admitted to tele for ruling out ACS, CHF exacerbation, multifocal pneumonia.   Patient has difficulty walking . PT re-evaluation was done and patient is qualifying for [facility].  Case management Angeline on board . Patient's family was given options and case management is working on it .

## 2017-08-30 NOTE — PROGRESS NOTE ADULT - SUBJECTIVE AND OBJECTIVE BOX
PGY 1 Note discussed with supervising resident and primary attending    Patient is a 83y old  Male who presents with a chief complaint of SOB x 1 week (28 Aug 2017 16:07)      INTERVAL HPI/OVERNIGHT EVENTS: difficulty walking     MEDICATIONS  (STANDING):  ALBUTerol/ipratropium for Nebulization 3 milliLiter(s) Nebulizer every 6 hours  aspirin  chewable 81 milliGRAM(s) Oral daily  atorvastatin 40 milliGRAM(s) Oral at bedtime  cefTRIAXone   IVPB 1 Gram(s) IV Intermittent every 24 hours  doxycycline hyclate Capsule 100 milliGRAM(s) Oral every 12 hours  tamsulosin 0.4 milliGRAM(s) Oral at bedtime  levETIRAcetam 1000 milliGRAM(s) Oral two times a day  citalopram 10 milliGRAM(s) Oral daily  losartan 25 milliGRAM(s) Oral daily  levothyroxine 12.5 MICROGram(s) Oral daily  cyanocobalamin 500 MICROGram(s) Oral daily  folic acid 1 milliGRAM(s) Oral daily  metoprolol 25 milliGRAM(s) Oral two times a day  warfarin 3 milliGRAM(s) Oral once    MEDICATIONS  (PRN):  acetaminophen   Tablet. 650 milliGRAM(s) Oral every 6 hours PRN Mild Pain (1 - 3)      __________________________________________________  REVIEW OF SYSTEMS:    CONSTITUTIONAL: No fever,   EYES: no acute visual disturbances  NECK: No pain or stiffness  RESPIRATORY: No cough; No shortness of breath  CARDIOVASCULAR: No chest pain, no palpitations  GASTROINTESTINAL: No pain. No nausea or vomiting; No diarrhea   NEUROLOGICAL: No headache or numbness, no tremors  MUSCULOSKELETAL: No joint pain, no muscle pain  GENITOURINARY: no dysuria, no frequency, no hesitancy  PSYCHIATRY: no depression , no anxiety  ALL OTHER  ROS negative        Vital Signs Last 24 Hrs  T(C): 37 (30 Aug 2017 15:49), Max: 37.5 (30 Aug 2017 04:49)  T(F): 98.6 (30 Aug 2017 15:49), Max: 99.5 (30 Aug 2017 04:49)  HR: 66 (30 Aug 2017 15:49) (64 - 77)  BP: 105/58 (30 Aug 2017 15:49) (94/53 - 114/68)  BP(mean): --  RR: 18 (30 Aug 2017 15:49) (17 - 18)  SpO2: 100% (30 Aug 2017 15:49) (97% - 100%)    ________________________________________________  PHYSICAL EXAM:  GENERAL: NAD  HEENT: Normocephalic;  conjunctivae and sclerae clear; moist mucous membranes;   NECK : supple  CHEST/LUNG: Clear to auscultation bilaterally with good air entry   HEART: S1 S2  regular; no murmurs, gallops or rubs  ABDOMEN: Soft, Nontender, Nondistended; Bowel sounds present  EXTREMITIES: no cyanosis; no edema; no calf tenderness  SKIN: warm and dry; no rash  NERVOUS SYSTEM:  Awake and alert; Oriented  to place, person and time ; no new deficits    _________________________________________________  LABS:                        12.4   7.2   )-----------( 155      ( 30 Aug 2017 07:21 )             37.3     08-30    135  |  98  |  16  ----------------------------<  92  3.4<L>   |  26  |  1.23    Ca    9.5      30 Aug 2017 07:21  Phos  2.5     08-29  Mg     2.0     08-29      PT/INR - ( 30 Aug 2017 07:21 )   PT: 18.8 sec;   INR: 1.71 ratio             CAPILLARY BLOOD GLUCOSE            RADIOLOGY & ADDITIONAL TESTS:    Imaging Personally Reviewed:  YES/NO    Consultant(s) Notes Reviewed:   YES/ No    Care Discussed with Consultants :     Plan of care was discussed with patient and /or primary care giver; all questions and concerns were addressed and care was aligned with patient's wishes.

## 2017-08-30 NOTE — PROGRESS NOTE ADULT - PROBLEM SELECTOR PLAN 9
No need for GI px.
Improve score is 1, 3 month risk is 0.4, c/w coumadin for DVT ppx  No need for GI px.

## 2017-08-30 NOTE — PROGRESS NOTE ADULT - SUBJECTIVE AND OBJECTIVE BOX
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 History provided by the granddaughter and wife at the bedside. According to her patient usually feels SOB while he walks short distance uses 3- 4 pillows at night, associated with lower extremity swelling. He has PPM placed about 2 yrs ago as per family his heart not functioning fine.  He has accident in 2012, head surgery done , after that he has another stroke, placed on warfarin. Last INR checked about a month ago.  He also has dry cough. Denies fever, chills, chest pain, nausea, vomiting, sick contacts, recent travel, or any other complaints. His last visit to PMD about a week ago. Never had EGD and colonoscopy done. (25 Aug 2017 15:31)      Patient is a 83y old  Male who presents with a chief complaint of SOB x 1 week (28 Aug 2017 16:07)  still has foleys..  no c/o on chair comfortable.  c/o rt chest pain.  ( stroke side) from 5-6 days.  when raises hand no pain  no neck pain.  no lt sided cp or palp.        INTERVAL HPI/OVERNIGHT EVENTS:  T(C): 36.6 (08-30-17 @ 11:40), Max: 37.5 (08-30-17 @ 04:49)  HR: 77 (08-30-17 @ 13:50) (64 - 77)  BP: 110/59 (08-30-17 @ 13:50) (94/53 - 114/68)  RR: 17 (08-30-17 @ 11:40) (17 - 18)  SpO2: 100% (08-30-17 @ 13:50) (97% - 100%)  Wt(kg): --  I&O's Summary    29 Aug 2017 07:01  -  30 Aug 2017 07:00  --------------------------------------------------------  IN: 100 mL / OUT: 1750 mL / NET: -1650 mL    30 Aug 2017 07:01  -  30 Aug 2017 15:40  --------------------------------------------------------  IN: 0 mL / OUT: 200 mL / NET: -200 mL        REVIEW OF SYSTEMS: denies fever, chills, SOB, palpitations, chest pain, abdominal pain, nausea, vomitting, diarrhea, constipation, dizziness    MEDICATIONS  (STANDING):  ALBUTerol/ipratropium for Nebulization 3 milliLiter(s) Nebulizer every 6 hours  aspirin  chewable 81 milliGRAM(s) Oral daily  atorvastatin 40 milliGRAM(s) Oral at bedtime  cefTRIAXone   IVPB 1 Gram(s) IV Intermittent every 24 hours  doxycycline hyclate Capsule 100 milliGRAM(s) Oral every 12 hours  tamsulosin 0.4 milliGRAM(s) Oral at bedtime  levETIRAcetam 1000 milliGRAM(s) Oral two times a day  citalopram 10 milliGRAM(s) Oral daily  losartan 25 milliGRAM(s) Oral daily  levothyroxine 12.5 MICROGram(s) Oral daily  cyanocobalamin 500 MICROGram(s) Oral daily  folic acid 1 milliGRAM(s) Oral daily  metoprolol 25 milliGRAM(s) Oral two times a day  potassium chloride    Tablet ER 40 milliEquivalent(s) Oral every 4 hours  warfarin 3 milliGRAM(s) Oral once    MEDICATIONS  (PRN):  acetaminophen   Tablet. 650 milliGRAM(s) Oral every 6 hours PRN Mild Pain (1 - 3)      PHYSICAL EXAM:  GENERAL: NAD, well-groomed, well-developed  HEAD:  Atraumatic, Normocephalic  EYES: EOMI, PERRLA, conjunctiva and sclera clear  ENMT: No tonsillar erythema, exudates, or enlargement; Moist mucous membranes, Good dentition, No lesions  NECK: Supple, No JVD, Normal thyroid  NERVOUS SYSTEM:  Alert & Oriented X3, Good concentration; Motor Strength 5/5 B/L upper and lower extremities; DTRs 2+ intact and symmetric  CHEST/LUNG: Clear to percussion bilaterally; No rales, rhonchi, wheezing, or rubs  HEART: Regular rate and rhythm; No murmurs, rubs, or gallops  ABDOMEN: Soft, Nontender, Nondistended; Bowel sounds present  EXTREMITIES:  2+ Peripheral Pulses, No clubbing, cyanosis, or edema  LYMPH: No lymphadenopathy noted  SKIN: No rashes or lesions  LABS:                        12.4   7.2   )-----------( 155      ( 30 Aug 2017 07:21 )             37.3     08-30    135  |  98  |  16  ----------------------------<  92  3.4<L>   |  26  |  1.23    Ca    9.5      30 Aug 2017 07:21  Phos  2.5     08-29  Mg     2.0     08-29      PT/INR - ( 30 Aug 2017 07:21 )   PT: 18.8 sec;   INR: 1.71 ratio             CAPILLARY BLOOD GLUCOSE

## 2017-08-30 NOTE — PROGRESS NOTE ADULT - ASSESSMENT
cad, ppm, cva, htn, bph, penileimplant, had some retention  foleys was inserted .  now family wanted to d/cd but uro from [facility] called [facility] of pt and was told  let pt come out with foleys cath. as per pvt urologist, pt has urethral stenosis , so he can come with foleys.   foleys was inserted as pt was c/o lower abd pain and had suprapubic tenderness and nurse did bedside son had 4oo to 500 cc of urine.  urologist called as pt has penile implant and urologist inserted foleys.  cva  seizure  neuro out pt.asa  controll chol, bp,   ppm cardio out pt  suppl k  htn, hypothyroidism cont same cad, ppm, cva, htn, bph, penileimplant, had some retention  foleys was inserted .  now family wanted to d/cd but uro from [facility] called [facility] of pt and was told  let pt come out with foleys cath. as per pvt urologist, pt has urethral stenosis , so he can come with foleys.   foleys was inserted as pt was c/o lower abd pain and had suprapubic tenderness and nurse did bedside son had 4oo to 500 cc of urine.  urologist called as pt has penile implant and urologist inserted foleys.  cva  seizure  neuro out pt.asa  controll chol, bp,   ppm cardio out pt  suppl k  htn, hypothyroidism cont same   pt has arrythmia  was cleared by card  as per family pt feelss dizzy occassionally, on toprol 25 bid and flomax.  flomax hs   and a few extra cups of water.  afib on coumadine  need inr in a few days then q weekly.  any bleeding call md.

## 2017-08-30 NOTE — PROGRESS NOTE ADULT - PROBLEM SELECTOR PLAN 1
patient doesn't have cough anymore ; no sob   F/up on blood cultures negative  Will continue with rocephin and doxycycline ( day 6 )

## 2017-08-30 NOTE — PROGRESS NOTE ADULT - SUBJECTIVE AND OBJECTIVE BOX
Upon speaking with Dr. Rosario, he had a conversation with the patients own Urologist, Dr. Mcmahan, and the decision was made to discharge the patient with the palumbo catheter. He will follow up with Dr. Mcmahan in the office in a few days where he will remove the palumbo. Thank you.

## 2017-08-30 NOTE — PROGRESS NOTE ADULT - PROBLEM SELECTOR PLAN 2
no SOB, lower extremity swelling (decreased )  CXR shows cardiomegaly with multifocal pneumonia and congestive change. Moderate right pleural effusion. on admission   Probnp elevated to 4337 in ED  crackles - very mild in right lower lobe   Patient was started on lasix 4o mg po daily , however BP is 85/45 . will hold lasix for now   Monitor I/Os, daily weights   f/up on ECHO : Calcified trileaflet aortic valve with normal  opening. Mean transaortic valve gradient equals 6 mm Hg,  consistent with normal aortic stenosis. Trace aortic  regurgitation.  Left Ventricle: Normal Left Ventricular Systolic Function,  (EF = 55 to 60%) Moderate concentric left ventricular  hypertrophy. Grade II diastolic dysfunction  c/w telemetry   EKG shows paced rhythm, wide QRS rhythm with premature complexes, QTC of 513 ms, no ST , T wave abnormality  T1 is slightly elevated to 0.041, f/up on T2 and t3 - negative   Has PPM ( last interrogated about 2 months ago as per wife) , [employer].  C/w aspirin, statin, beta blocker  Cardio consult Dr Hairston  Patient has persistent episodes of tachycardia on tele ; will increase metoprolol to 25 bid with parameters

## 2017-08-31 LAB
ALBUMIN SERPL ELPH-MCNC: 3.4 G/DL — LOW (ref 3.5–5)
ALP SERPL-CCNC: 182 U/L — HIGH (ref 40–120)
ALT FLD-CCNC: 42 U/L DA — SIGNIFICANT CHANGE UP (ref 10–60)
ANION GAP SERPL CALC-SCNC: 11 MMOL/L — SIGNIFICANT CHANGE UP (ref 5–17)
AST SERPL-CCNC: 55 U/L — HIGH (ref 10–40)
BILIRUB SERPL-MCNC: 1.4 MG/DL — HIGH (ref 0.2–1.2)
BUN SERPL-MCNC: 17 MG/DL — SIGNIFICANT CHANGE UP (ref 7–18)
CALCIUM SERPL-MCNC: 9.2 MG/DL — SIGNIFICANT CHANGE UP (ref 8.4–10.5)
CHLORIDE SERPL-SCNC: 100 MMOL/L — SIGNIFICANT CHANGE UP (ref 96–108)
CO2 SERPL-SCNC: 23 MMOL/L — SIGNIFICANT CHANGE UP (ref 22–31)
CREAT SERPL-MCNC: 1.1 MG/DL — SIGNIFICANT CHANGE UP (ref 0.5–1.3)
CULTURE RESULTS: SIGNIFICANT CHANGE UP
CULTURE RESULTS: SIGNIFICANT CHANGE UP
GLUCOSE SERPL-MCNC: 98 MG/DL — SIGNIFICANT CHANGE UP (ref 70–99)
HCT VFR BLD CALC: 35.1 % — LOW (ref 39–50)
HGB BLD-MCNC: 11.9 G/DL — LOW (ref 13–17)
MCHC RBC-ENTMCNC: 30 PG — SIGNIFICANT CHANGE UP (ref 27–34)
MCHC RBC-ENTMCNC: 34 GM/DL — SIGNIFICANT CHANGE UP (ref 32–36)
MCV RBC AUTO: 88.5 FL — SIGNIFICANT CHANGE UP (ref 80–100)
PLATELET # BLD AUTO: 142 K/UL — LOW (ref 150–400)
POTASSIUM SERPL-MCNC: 3.8 MMOL/L — SIGNIFICANT CHANGE UP (ref 3.5–5.3)
POTASSIUM SERPL-SCNC: 3.8 MMOL/L — SIGNIFICANT CHANGE UP (ref 3.5–5.3)
PROT SERPL-MCNC: 7.3 G/DL — SIGNIFICANT CHANGE UP (ref 6–8.3)
RBC # BLD: 3.97 M/UL — LOW (ref 4.2–5.8)
RBC # FLD: 13.2 % — SIGNIFICANT CHANGE UP (ref 10.3–14.5)
SODIUM SERPL-SCNC: 134 MMOL/L — LOW (ref 135–145)
SPECIMEN SOURCE: SIGNIFICANT CHANGE UP
SPECIMEN SOURCE: SIGNIFICANT CHANGE UP
WBC # BLD: 8.7 K/UL — SIGNIFICANT CHANGE UP (ref 3.8–10.5)
WBC # FLD AUTO: 8.7 K/UL — SIGNIFICANT CHANGE UP (ref 3.8–10.5)

## 2017-08-31 PROCEDURE — 76770 US EXAM ABDO BACK WALL COMP: CPT | Mod: 26

## 2017-08-31 RX ORDER — WARFARIN SODIUM 2.5 MG/1
3.5 TABLET ORAL ONCE
Qty: 0 | Refills: 0 | Status: COMPLETED | OUTPATIENT
Start: 2017-08-31 | End: 2017-08-31

## 2017-08-31 RX ADMIN — Medication 12.5 MICROGRAM(S): at 05:33

## 2017-08-31 RX ADMIN — Medication 100 MILLIGRAM(S): at 17:05

## 2017-08-31 RX ADMIN — Medication 3 MILLILITER(S): at 09:26

## 2017-08-31 RX ADMIN — Medication 25 MILLIGRAM(S): at 05:33

## 2017-08-31 RX ADMIN — TAMSULOSIN HYDROCHLORIDE 0.4 MILLIGRAM(S): 0.4 CAPSULE ORAL at 21:35

## 2017-08-31 RX ADMIN — LOSARTAN POTASSIUM 25 MILLIGRAM(S): 100 TABLET, FILM COATED ORAL at 05:33

## 2017-08-31 RX ADMIN — ATORVASTATIN CALCIUM 40 MILLIGRAM(S): 80 TABLET, FILM COATED ORAL at 21:34

## 2017-08-31 RX ADMIN — Medication 3 MILLILITER(S): at 15:12

## 2017-08-31 RX ADMIN — LEVETIRACETAM 1000 MILLIGRAM(S): 250 TABLET, FILM COATED ORAL at 17:04

## 2017-08-31 RX ADMIN — Medication 3 MILLILITER(S): at 20:38

## 2017-08-31 RX ADMIN — WARFARIN SODIUM 3.5 MILLIGRAM(S): 2.5 TABLET ORAL at 21:35

## 2017-08-31 RX ADMIN — Medication 25 MILLIGRAM(S): at 17:05

## 2017-08-31 RX ADMIN — Medication 3 MILLILITER(S): at 03:52

## 2017-08-31 RX ADMIN — Medication 1 MILLIGRAM(S): at 11:49

## 2017-08-31 RX ADMIN — PREGABALIN 500 MICROGRAM(S): 225 CAPSULE ORAL at 11:49

## 2017-08-31 RX ADMIN — Medication 81 MILLIGRAM(S): at 11:48

## 2017-08-31 RX ADMIN — Medication 100 MILLIGRAM(S): at 05:33

## 2017-08-31 RX ADMIN — CITALOPRAM 10 MILLIGRAM(S): 10 TABLET, FILM COATED ORAL at 11:48

## 2017-08-31 RX ADMIN — CEFTRIAXONE 100 GRAM(S): 500 INJECTION, POWDER, FOR SOLUTION INTRAMUSCULAR; INTRAVENOUS at 22:05

## 2017-08-31 RX ADMIN — LEVETIRACETAM 1000 MILLIGRAM(S): 250 TABLET, FILM COATED ORAL at 05:33

## 2017-08-31 NOTE — PROGRESS NOTE ADULT - ASSESSMENT
IMPRESSION AND PLAN:    Problem/Plan - 1:  ·  Problem: Multifocal pneumonia.  Plan: Worsening SOB x 1 week  Dry cough, no fever, no wbc count   Bilateral crackles on exam   CXR shows multifocal pneumonia, also congested   Got Levaquin by ED before sending blood cultures,awaiting Legionella titres    Problem/Plan - 2:  ·  Problem: CHF exacerbation Diastolic  Plan: SOB, lower extremity swelling   CXR shows cardiomegaly with multifocal pneumonia and congestive change. Moderate right pleural effusion.   Probnp elevated to 4337  Mild Bilateral crackles on exam   C/w lasix 40 mg IV daily   Monitor I/Os, daily weights -Difficulty urinating-Bladder scan PAIN/discharge

## 2017-08-31 NOTE — PROGRESS NOTE ADULT - ASSESSMENT
has foleys cath.  as per conversation of Dr Tucker with Dr preciado about foleys that pt needs foleys as has urethera stenosis. as per pt [facility].   out pt urology in next 2-3 days  cxr improved still has some infiltrate but no cough, no wbc high, no fever.  cxr in 2 weeks if infiltrate persists then ct chest.  afib on coumadin  watch INR carefully.  cardiology  for cad and  pacemaker.  cva, dementia  asa  lipitor  pts bp on border line but fels occasionaly dizzy as per daughter.  so not on lasix.

## 2017-08-31 NOTE — PROGRESS NOTE ADULT - SUBJECTIVE AND OBJECTIVE BOX
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,, 2014, on warfarin), seizures, BPH, HTN, PPM ( placed about 2 yrs ago, last interrogated about 2 months ago)presents to ED c/o SOB x1 week.No chest pain,Foleys draining clear,feels better-No significant arrhythmias noted      MEDICATIONS  (STANDING):  ALBUTerol/ipratropium for Nebulization 3 milliLiter(s) Nebulizer every 6 hours  aspirin  chewable 81 milliGRAM(s) Oral daily  atorvastatin 40 milliGRAM(s) Oral at bedtime  cefTRIAXone   IVPB 1 Gram(s) IV Intermittent every 24 hours  doxycycline hyclate Capsule 100 milliGRAM(s) Oral every 12 hours  tamsulosin 0.4 milliGRAM(s) Oral at bedtime  levETIRAcetam 1000 milliGRAM(s) Oral two times a day  citalopram 10 milliGRAM(s) Oral daily  losartan 25 milliGRAM(s) Oral daily  levothyroxine 12.5 MICROGram(s) Oral daily  cyanocobalamin 500 MICROGram(s) Oral daily  folic acid 1 milliGRAM(s) Oral daily  metoprolol 25 milliGRAM(s) Oral two times a day    MEDICATIONS  (PRN):  acetaminophen   Tablet. 650 milliGRAM(s) Oral every 6 hours PRN Mild Pain (1 - 3)            Vital Signs Last 24 Hrs  T(C): 36.4 (31 Aug 2017 07:29), Max: 37 (30 Aug 2017 15:49)  T(F): 97.5 (31 Aug 2017 07:29), Max: 98.6 (30 Aug 2017 15:49)  HR: 69 (31 Aug 2017 07:29) (60 - 80)  BP: 111/61 (31 Aug 2017 07:29) (94/53 - 135/64)  BP(mean): --  RR: 16 (31 Aug 2017 07:29) (16 - 18)  SpO2: 100% (31 Aug 2017 07:29) (96% - 100%)    REVIEW OF SYSTEMS:  CONSTITUTIONAL: No fever, weight loss, or fatigue  EYES: No eye pain, visual disturbances, or discharge  ENMT:  No difficulty hearing, tinnitus, vertigo; No sinus or throat pain  NECK: No pain or stiffness  RESPIRATORY: No cough, wheezing, chills or hemoptysis; No shortness of breath  CARDIOVASCULAR: No chest pain, palpitations, dizziness, or leg swelling  GASTROINTESTINAL: No abdominal or epigastric pain. No nausea, vomiting, or hematemesis; No diarrhea or constipation. No melena or hematochezia.  GENITOURINARY: No dysuria, frequency, hematuria, or incontinence  NEUROLOGICAL: No headaches, memory loss, loss of strength, numbness, or tremors  SKIN: No itching, burning, rashes, or lesions   LYMPH NODES: No enlarged glands  ENDOCRINE: No heat or cold intolerance; No hair loss  MUSCULOSKELETAL: No joint pain or swelling; No muscle, back, or extremity pain  PSYCHIATRIC: No depression, anxiety, mood swings, or difficulty sleeping  HEME/LYMPH: No easy bruising, or bleeding gums  ALLERY AND IMMUNOLOGIC: No hives or eczema    PHYSICAL EXAM:  · CONSTITUTIONAL:	Well-developed, well nourished    BMI-  · EYES:	EOMI; PERRL; no drainage or redness  · ENMT	No oral lesions; no gross abnormalities  · NECK:	No bruits; no thyromegaly or nodules  ·BACK:	No deformity or limitation of movement  ·RESPIRATORY:   airway patent; breath sounds equal; good air movement; respirations non-labored; clear to auscultation bilaterally; no chest wall tenderness; no intercostal retractions; no rales,rhonchi or wheeze  · CARDIOVASCULAR	regular rate and rhythm  no rub  no murmur  normal PMI  . GASTROINTESTINAL:  no distention; no masses palpable; bowel sounds normal; no rebound tenderness; no guarding; no rigidity; no organomegaly  · EXTREMITIES: No cyanosis, clubbing or edema  · VASCULAR: 	Equal and normal pulses (carotid, femoral, dorsalis pedis)  ·NEUROLOGICAL:   alert and oriented x 3; sensation intact; deep reflexes intact; cranial nerves intact; no spontaneous movement; superficial reflexes intact; normal strength  · SKIN:	No lesions; no rash  . LYMPH NODES:	No lymphadedenopathy  · MUSCULOSKELETAL:   No calf tenderness  no joint swelling	  TELEMETRY:    ECG:    TTE:    LABS:                        11.9   8.7   )-----------( 142      ( 31 Aug 2017 07:37 )             35.1     08-31    134<L>  |  100  |  17  ----------------------------<  98  3.8   |  23  |  1.10    Ca    9.2      31 Aug 2017 07:37    TPro  7.3  /  Alb  3.4<L>  /  TBili  1.4<H>  /  DBili  x   /  AST  55<H>  /  ALT  42  /  AlkPhos  182<H>  08-31        PT/INR - ( 30 Aug 2017 18:12 )   PT: 18.4 sec;   INR: 1.67 ratio             I&O's Summary    30 Aug 2017 07:01  -  31 Aug 2017 07:00  --------------------------------------------------------  IN: 350 mL / OUT: 650 mL / NET: -300 mL    31 Aug 2017 07:01  -  31 Aug 2017 10:47  --------------------------------------------------------  IN: 100 mL / OUT: 0 mL / NET: 100 mL      BNP  RADIOLOGY & ADDITIONAL STUDIES:    IMPRESSION AND PLAN:

## 2017-08-31 NOTE — PROGRESS NOTE ADULT - SUBJECTIVE AND OBJECTIVE BOX
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 History provided by the granddaughter and wife at the bedside. According to her patient usually feels SOB while he walks short distance uses 3- 4 pillows at night, associated with lower extremity swelling. He has PPM placed about 2 yrs ago as per family his heart not functioning fine.  He has accident in 2012, head surgery done , after that he has another stroke, placed on warfarin. Last INR checked about a month ago.  He also has dry cough. Denies fever, chills, chest pain, nausea, vomiting, sick contacts, recent travel, or any other complaints. His last visit to PMD about a week ago. Never had EGD and colonoscopy done. (25 Aug 2017 15:31)      no complaints  no cp or sob or palp  no abd pain       Patient is a 83y old  Male who presents with a chief complaint of SOB x 1 week (28 Aug 2017 16:07)      INTERVAL HPI/OVERNIGHT EVENTS:  T(C): 36.5 (08-31-17 @ 11:29), Max: 37 (08-30-17 @ 15:49)  HR: 72 (08-31-17 @ 11:29) (60 - 80)  BP: 98/56 (08-31-17 @ 11:29) (98/56 - 135/64)  RR: 18 (08-31-17 @ 11:29) (16 - 18)  SpO2: 100% (08-31-17 @ 11:29) (96% - 100%)  Wt(kg): --  I&O's Summary    30 Aug 2017 07:01  -  31 Aug 2017 07:00  --------------------------------------------------------  IN: 350 mL / OUT: 650 mL / NET: -300 mL    31 Aug 2017 07:01  -  31 Aug 2017 13:10  --------------------------------------------------------  IN: 100 mL / OUT: 0 mL / NET: 100 mL        REVIEW OF SYSTEMS: denies fever, chills, SOB, palpitations, chest pain, abdominal pain, nausea, vomitting, diarrhea, constipation, dizziness    MEDICATIONS  (STANDING):  ALBUTerol/ipratropium for Nebulization 3 milliLiter(s) Nebulizer every 6 hours  aspirin  chewable 81 milliGRAM(s) Oral daily  atorvastatin 40 milliGRAM(s) Oral at bedtime  cefTRIAXone   IVPB 1 Gram(s) IV Intermittent every 24 hours  doxycycline hyclate Capsule 100 milliGRAM(s) Oral every 12 hours  tamsulosin 0.4 milliGRAM(s) Oral at bedtime  levETIRAcetam 1000 milliGRAM(s) Oral two times a day  citalopram 10 milliGRAM(s) Oral daily  losartan 25 milliGRAM(s) Oral daily  levothyroxine 12.5 MICROGram(s) Oral daily  cyanocobalamin 500 MICROGram(s) Oral daily  folic acid 1 milliGRAM(s) Oral daily  metoprolol 25 milliGRAM(s) Oral two times a day  warfarin 3.5 milliGRAM(s) Oral once    MEDICATIONS  (PRN):  acetaminophen   Tablet. 650 milliGRAM(s) Oral every 6 hours PRN Mild Pain (1 - 3)      PHYSICAL EXAM:  GENERAL: NAD, well-groomed, well-developed  HEAD:  Atraumatic, Normocephalic  EYES: EOMI, PERRLA, conjunctiva and sclera clear  ENMT: No tonsillar erythema, exudates, or enlargement; Moist mucous membranes, Good dentition, No lesions  NECK: Supple, No JVD, Normal thyroid  NERVOUS SYSTEM:  Alert & Oriented X3, Good concentration; Motor Strength 5/5 B/L upper and lower extremities; DTRs 2+ intact and symmetric  CHEST/LUNG: Clear to percussion bilaterally; No rales, rhonchi, wheezing, or rubs  HEART: Regular rate and rhythm; No murmurs, rubs, or gallops  ABDOMEN: Soft, Nontender, Nondistended; Bowel sounds present  EXTREMITIES:  2+ Peripheral Pulses, No clubbing, cyanosis, or edema  LYMPH: No lymphadenopathy noted  SKIN: No rashes or lesions  LABS:                        11.9   8.7   )-----------( 142      ( 31 Aug 2017 07:37 )             35.1     08-31    134<L>  |  100  |  17  ----------------------------<  98  3.8   |  23  |  1.10    Ca    9.2      31 Aug 2017 07:37    TPro  7.3  /  Alb  3.4<L>  /  TBili  1.4<H>  /  DBili  x   /  AST  55<H>  /  ALT  42  /  AlkPhos  182<H>  08-31    PT/INR - ( 30 Aug 2017 18:12 )   PT: 18.4 sec;   INR: 1.67 ratio             CAPILLARY BLOOD GLUCOSE

## 2017-09-01 VITALS
DIASTOLIC BLOOD PRESSURE: 53 MMHG | OXYGEN SATURATION: 100 % | TEMPERATURE: 98 F | HEART RATE: 61 BPM | SYSTOLIC BLOOD PRESSURE: 92 MMHG | RESPIRATION RATE: 20 BRPM

## 2017-09-01 LAB
ANION GAP SERPL CALC-SCNC: 10 MMOL/L — SIGNIFICANT CHANGE UP (ref 5–17)
APTT BLD: 33.7 SEC — SIGNIFICANT CHANGE UP (ref 27.5–37.4)
BUN SERPL-MCNC: 17 MG/DL — SIGNIFICANT CHANGE UP (ref 7–18)
CALCIUM SERPL-MCNC: 9 MG/DL — SIGNIFICANT CHANGE UP (ref 8.4–10.5)
CHLORIDE SERPL-SCNC: 101 MMOL/L — SIGNIFICANT CHANGE UP (ref 96–108)
CO2 SERPL-SCNC: 25 MMOL/L — SIGNIFICANT CHANGE UP (ref 22–31)
CREAT SERPL-MCNC: 1.11 MG/DL — SIGNIFICANT CHANGE UP (ref 0.5–1.3)
GLUCOSE SERPL-MCNC: 95 MG/DL — SIGNIFICANT CHANGE UP (ref 70–99)
HCT VFR BLD CALC: 33.6 % — LOW (ref 39–50)
HGB BLD-MCNC: 11.5 G/DL — LOW (ref 13–17)
INR BLD: 1.58 RATIO — HIGH (ref 0.88–1.16)
MCHC RBC-ENTMCNC: 30.2 PG — SIGNIFICANT CHANGE UP (ref 27–34)
MCHC RBC-ENTMCNC: 34.1 GM/DL — SIGNIFICANT CHANGE UP (ref 32–36)
MCV RBC AUTO: 88.5 FL — SIGNIFICANT CHANGE UP (ref 80–100)
PLATELET # BLD AUTO: 129 K/UL — LOW (ref 150–400)
POTASSIUM SERPL-MCNC: 4.3 MMOL/L — SIGNIFICANT CHANGE UP (ref 3.5–5.3)
POTASSIUM SERPL-SCNC: 4.3 MMOL/L — SIGNIFICANT CHANGE UP (ref 3.5–5.3)
PROTHROM AB SERPL-ACNC: 17.4 SEC — HIGH (ref 9.8–12.7)
RBC # BLD: 3.8 M/UL — LOW (ref 4.2–5.8)
RBC # FLD: 13.4 % — SIGNIFICANT CHANGE UP (ref 10.3–14.5)
SODIUM SERPL-SCNC: 136 MMOL/L — SIGNIFICANT CHANGE UP (ref 135–145)
WBC # BLD: 7.5 K/UL — SIGNIFICANT CHANGE UP (ref 3.8–10.5)
WBC # FLD AUTO: 7.5 K/UL — SIGNIFICANT CHANGE UP (ref 3.8–10.5)

## 2017-09-01 RX ORDER — WARFARIN SODIUM 2.5 MG/1
5 TABLET ORAL ONCE
Qty: 0 | Refills: 0 | Status: DISCONTINUED | OUTPATIENT
Start: 2017-09-01 | End: 2017-09-01

## 2017-09-01 RX ADMIN — PREGABALIN 500 MICROGRAM(S): 225 CAPSULE ORAL at 11:32

## 2017-09-01 RX ADMIN — Medication 81 MILLIGRAM(S): at 11:32

## 2017-09-01 RX ADMIN — Medication 25 MILLIGRAM(S): at 05:58

## 2017-09-01 RX ADMIN — Medication 3 MILLILITER(S): at 02:17

## 2017-09-01 RX ADMIN — CITALOPRAM 10 MILLIGRAM(S): 10 TABLET, FILM COATED ORAL at 11:32

## 2017-09-01 RX ADMIN — Medication 100 MILLIGRAM(S): at 05:58

## 2017-09-01 RX ADMIN — Medication 1 MILLIGRAM(S): at 11:32

## 2017-09-01 RX ADMIN — Medication 3 MILLILITER(S): at 08:44

## 2017-09-01 RX ADMIN — LEVETIRACETAM 1000 MILLIGRAM(S): 250 TABLET, FILM COATED ORAL at 05:58

## 2017-09-01 RX ADMIN — Medication 12.5 MICROGRAM(S): at 05:58

## 2017-09-01 NOTE — CHART NOTE - NSCHARTNOTEFT_GEN_A_CORE
Patient presented with a supratherapeutic INR on admission . Hence after giving one dose of warfarin 3mg in ED , warfarin was held . After the INR was therapeutic , warfarin 2 mg for given for a day , with a consistent fall in inr , warfarin 3 mg was started . However today on day of discharge , INR is 1.17 . Hence warfarin 5 mg OD was resumed . [facility] was informed , about resuming the old dose , and inr to be monitored daily .

## 2017-09-06 DIAGNOSIS — F32.9 MAJOR DEPRESSIVE DISORDER, SINGLE EPISODE, UNSPECIFIED: ICD-10-CM

## 2017-09-06 DIAGNOSIS — E87.3 ALKALOSIS: ICD-10-CM

## 2017-09-06 DIAGNOSIS — I35.0 NONRHEUMATIC AORTIC (VALVE) STENOSIS: ICD-10-CM

## 2017-09-06 DIAGNOSIS — Z95.0 PRESENCE OF CARDIAC PACEMAKER: ICD-10-CM

## 2017-09-06 DIAGNOSIS — J90 PLEURAL EFFUSION, NOT ELSEWHERE CLASSIFIED: ICD-10-CM

## 2017-09-06 DIAGNOSIS — R31.9 HEMATURIA, UNSPECIFIED: ICD-10-CM

## 2017-09-06 DIAGNOSIS — Z86.73 PERSONAL HISTORY OF TRANSIENT ISCHEMIC ATTACK (TIA), AND CEREBRAL INFARCTION WITHOUT RESIDUAL DEFICITS: ICD-10-CM

## 2017-09-06 DIAGNOSIS — I48.2 CHRONIC ATRIAL FIBRILLATION: ICD-10-CM

## 2017-09-06 DIAGNOSIS — I10 ESSENTIAL (PRIMARY) HYPERTENSION: ICD-10-CM

## 2017-09-06 DIAGNOSIS — I51.7 CARDIOMEGALY: ICD-10-CM

## 2017-09-06 DIAGNOSIS — I27.2 OTHER SECONDARY PULMONARY HYPERTENSION: ICD-10-CM

## 2017-09-06 DIAGNOSIS — J18.9 PNEUMONIA, UNSPECIFIED ORGANISM: ICD-10-CM

## 2017-09-06 DIAGNOSIS — N40.0 BENIGN PROSTATIC HYPERPLASIA WITHOUT LOWER URINARY TRACT SYMPTOMS: ICD-10-CM

## 2017-09-06 DIAGNOSIS — N35.9 URETHRAL STRICTURE, UNSPECIFIED: ICD-10-CM

## 2017-09-06 DIAGNOSIS — E53.8 DEFICIENCY OF OTHER SPECIFIED B GROUP VITAMINS: ICD-10-CM

## 2017-09-06 DIAGNOSIS — E03.9 HYPOTHYROIDISM, UNSPECIFIED: ICD-10-CM

## 2017-09-06 DIAGNOSIS — I25.10 ATHEROSCLEROTIC HEART DISEASE OF NATIVE CORONARY ARTERY WITHOUT ANGINA PECTORIS: ICD-10-CM

## 2017-09-06 DIAGNOSIS — I50.23 ACUTE ON CHRONIC SYSTOLIC (CONGESTIVE) HEART FAILURE: ICD-10-CM

## 2017-10-19 PROCEDURE — 82607 VITAMIN B-12: CPT

## 2017-10-19 PROCEDURE — 93306 TTE W/DOPPLER COMPLETE: CPT

## 2017-10-19 PROCEDURE — 80074 ACUTE HEPATITIS PANEL: CPT

## 2017-10-19 PROCEDURE — 82306 VITAMIN D 25 HYDROXY: CPT

## 2017-10-19 PROCEDURE — 84145 PROCALCITONIN (PCT): CPT

## 2017-10-19 PROCEDURE — 80177 DRUG SCRN QUAN LEVETIRACETAM: CPT

## 2017-10-19 PROCEDURE — 85730 THROMBOPLASTIN TIME PARTIAL: CPT

## 2017-10-19 PROCEDURE — 83605 ASSAY OF LACTIC ACID: CPT

## 2017-10-19 PROCEDURE — 76770 US EXAM ABDO BACK WALL COMP: CPT

## 2017-10-19 PROCEDURE — 93005 ELECTROCARDIOGRAM TRACING: CPT

## 2017-10-19 PROCEDURE — 84484 ASSAY OF TROPONIN QUANT: CPT

## 2017-10-19 PROCEDURE — 80053 COMPREHEN METABOLIC PANEL: CPT

## 2017-10-19 PROCEDURE — 82746 ASSAY OF FOLIC ACID SERUM: CPT

## 2017-10-19 PROCEDURE — 99285 EMERGENCY DEPT VISIT HI MDM: CPT | Mod: 25

## 2017-10-19 PROCEDURE — 94640 AIRWAY INHALATION TREATMENT: CPT

## 2017-10-19 PROCEDURE — 87086 URINE CULTURE/COLONY COUNT: CPT

## 2017-10-19 PROCEDURE — 84443 ASSAY THYROID STIM HORMONE: CPT

## 2017-10-19 PROCEDURE — 83036 HEMOGLOBIN GLYCOSYLATED A1C: CPT

## 2017-10-19 PROCEDURE — 74018 RADEX ABDOMEN 1 VIEW: CPT

## 2017-10-19 PROCEDURE — 76705 ECHO EXAM OF ABDOMEN: CPT

## 2017-10-19 PROCEDURE — 86790 VIRUS ANTIBODY NOS: CPT

## 2017-10-19 PROCEDURE — 83880 ASSAY OF NATRIURETIC PEPTIDE: CPT

## 2017-10-19 PROCEDURE — 82550 ASSAY OF CK (CPK): CPT

## 2017-10-19 PROCEDURE — 85610 PROTHROMBIN TIME: CPT

## 2017-10-19 PROCEDURE — 80061 LIPID PANEL: CPT

## 2017-10-19 PROCEDURE — 97110 THERAPEUTIC EXERCISES: CPT

## 2017-10-19 PROCEDURE — 97116 GAIT TRAINING THERAPY: CPT

## 2017-10-19 PROCEDURE — 71045 X-RAY EXAM CHEST 1 VIEW: CPT

## 2017-10-19 PROCEDURE — 36415 COLL VENOUS BLD VENIPUNCTURE: CPT

## 2017-10-19 PROCEDURE — 82977 ASSAY OF GGT: CPT

## 2017-10-19 PROCEDURE — 80048 BASIC METABOLIC PNL TOTAL CA: CPT

## 2017-10-19 PROCEDURE — 83735 ASSAY OF MAGNESIUM: CPT

## 2017-10-19 PROCEDURE — 84100 ASSAY OF PHOSPHORUS: CPT

## 2017-10-19 PROCEDURE — 86704 HEP B CORE ANTIBODY TOTAL: CPT

## 2017-10-19 PROCEDURE — 81001 URINALYSIS AUTO W/SCOPE: CPT

## 2017-10-19 PROCEDURE — 97530 THERAPEUTIC ACTIVITIES: CPT

## 2017-10-19 PROCEDURE — 85027 COMPLETE CBC AUTOMATED: CPT

## 2017-10-19 PROCEDURE — 82803 BLOOD GASES ANY COMBINATION: CPT

## 2017-10-19 PROCEDURE — 82553 CREATINE MB FRACTION: CPT

## 2017-10-19 PROCEDURE — 87040 BLOOD CULTURE FOR BACTERIA: CPT

## 2017-10-19 PROCEDURE — 71275 CT ANGIOGRAPHY CHEST: CPT

## 2017-10-19 PROCEDURE — 82248 BILIRUBIN DIRECT: CPT

## 2017-11-16 PROBLEM — Z00.00 ENCOUNTER FOR PREVENTIVE HEALTH EXAMINATION: Status: ACTIVE | Noted: 2017-11-16

## 2017-12-04 ENCOUNTER — APPOINTMENT (OUTPATIENT)
Dept: UROLOGY | Facility: CLINIC | Age: 82
End: 2017-12-04
Payer: MEDICARE

## 2017-12-04 VITALS
RESPIRATION RATE: 17 BRPM | HEART RATE: 59 BPM | OXYGEN SATURATION: 98 % | DIASTOLIC BLOOD PRESSURE: 60 MMHG | SYSTOLIC BLOOD PRESSURE: 90 MMHG | TEMPERATURE: 98 F

## 2017-12-04 DIAGNOSIS — I63.9 CEREBRAL INFARCTION, UNSPECIFIED: ICD-10-CM

## 2017-12-04 DIAGNOSIS — I10 ESSENTIAL (PRIMARY) HYPERTENSION: ICD-10-CM

## 2017-12-04 DIAGNOSIS — R56.9 UNSPECIFIED CONVULSIONS: ICD-10-CM

## 2017-12-04 DIAGNOSIS — Z78.9 OTHER SPECIFIED HEALTH STATUS: ICD-10-CM

## 2017-12-04 DIAGNOSIS — Z95.0 PRESENCE OF CARDIAC PACEMAKER: ICD-10-CM

## 2017-12-04 PROCEDURE — 99205 OFFICE O/P NEW HI 60 MIN: CPT

## 2017-12-04 RX ORDER — WARFARIN SODIUM 2.5 MG/1
2.5 TABLET ORAL
Refills: 0 | Status: ACTIVE | COMMUNITY

## 2017-12-04 RX ORDER — LOSARTAN POTASSIUM 50 MG/1
50 TABLET, FILM COATED ORAL
Refills: 0 | Status: ACTIVE | COMMUNITY

## 2017-12-04 RX ORDER — MECLIZINE HYDROCHLORIDE 12.5 MG/1
12.5 TABLET ORAL
Refills: 0 | Status: ACTIVE | COMMUNITY

## 2017-12-04 RX ORDER — ATORVASTATIN CALCIUM 40 MG/1
40 TABLET, FILM COATED ORAL
Refills: 0 | Status: ACTIVE | COMMUNITY

## 2017-12-04 RX ORDER — LEVETIRACETAM 1000 MG/1
1000 TABLET, FILM COATED ORAL
Refills: 0 | Status: ACTIVE | COMMUNITY

## 2017-12-04 RX ORDER — LEVOTHYROXINE SODIUM 0.03 MG/1
25 TABLET ORAL
Refills: 0 | Status: ACTIVE | COMMUNITY

## 2017-12-04 RX ORDER — CITALOPRAM HYDROBROMIDE 10 MG/1
10 TABLET, FILM COATED ORAL
Refills: 0 | Status: ACTIVE | COMMUNITY

## 2017-12-04 RX ORDER — METOPROLOL TARTRATE 25 MG/1
25 TABLET, FILM COATED ORAL
Refills: 0 | Status: ACTIVE | COMMUNITY

## 2017-12-04 RX ORDER — AMOXICILLIN AND CLAVULANATE POTASSIUM 875; 125 MG/1; 1/1
875-125 TABLET, FILM COATED ORAL
Refills: 0 | Status: ACTIVE | COMMUNITY

## 2017-12-04 RX ORDER — FOLIC ACID 1 MG/1
1 TABLET ORAL
Refills: 0 | Status: ACTIVE | COMMUNITY

## 2017-12-04 RX ORDER — SENNOSIDES 8.6 MG TABLETS 8.6 MG/1
8.6 TABLET ORAL
Refills: 0 | Status: ACTIVE | COMMUNITY

## 2017-12-04 RX ORDER — GLUCOSAMINE/MSM/CHONDROIT SULF 500-166.6
500 TABLET ORAL
Refills: 0 | Status: ACTIVE | COMMUNITY

## 2017-12-04 RX ORDER — HYDROCORTISONE ACETATE 25 MG/1
25 SUPPOSITORY RECTAL
Refills: 0 | Status: ACTIVE | COMMUNITY

## 2017-12-04 RX ORDER — WARFARIN SODIUM 1 MG/1
1 TABLET ORAL
Refills: 0 | Status: ACTIVE | COMMUNITY

## 2017-12-04 RX ORDER — NYSTATIN 100000 [USP'U]/ML
100000 SUSPENSION ORAL
Refills: 0 | Status: ACTIVE | COMMUNITY

## 2017-12-14 ENCOUNTER — APPOINTMENT (OUTPATIENT)
Dept: UROLOGY | Facility: CLINIC | Age: 82
End: 2017-12-14
Payer: MEDICARE

## 2017-12-14 VITALS
SYSTOLIC BLOOD PRESSURE: 102 MMHG | RESPIRATION RATE: 16 BRPM | DIASTOLIC BLOOD PRESSURE: 68 MMHG | TEMPERATURE: 97.9 F | HEART RATE: 51 BPM

## 2017-12-14 DIAGNOSIS — R33.8 OTHER RETENTION OF URINE: ICD-10-CM

## 2017-12-14 PROCEDURE — 52000 CYSTOURETHROSCOPY: CPT

## 2017-12-14 RX ORDER — TAMSULOSIN HYDROCHLORIDE 0.4 MG/1
0.4 CAPSULE ORAL
Refills: 0 | Status: COMPLETED | COMMUNITY
End: 2017-12-14

## 2018-01-11 ENCOUNTER — APPOINTMENT (OUTPATIENT)
Dept: UROLOGY | Facility: CLINIC | Age: 83
End: 2018-01-11
Payer: MEDICARE

## 2018-01-11 VITALS
SYSTOLIC BLOOD PRESSURE: 90 MMHG | TEMPERATURE: 98.6 F | OXYGEN SATURATION: 99 % | HEART RATE: 60 BPM | DIASTOLIC BLOOD PRESSURE: 54 MMHG | RESPIRATION RATE: 16 BRPM

## 2018-01-11 DIAGNOSIS — R32 UNSPECIFIED URINARY INCONTINENCE: ICD-10-CM

## 2018-01-11 DIAGNOSIS — I50.9 HEART FAILURE, UNSPECIFIED: ICD-10-CM

## 2018-01-11 DIAGNOSIS — C61 MALIGNANT NEOPLASM OF PROSTATE: ICD-10-CM

## 2018-01-11 DIAGNOSIS — R60.0 LOCALIZED EDEMA: ICD-10-CM

## 2018-01-11 DIAGNOSIS — T83.9XXA UNSPECIFIED COMPLICATION OF GENITOURINARY PROSTHETIC DEVICE, IMPLANT AND GRAFT, INITIAL ENCOUNTER: ICD-10-CM

## 2018-01-11 PROCEDURE — 99213 OFFICE O/P EST LOW 20 MIN: CPT

## 2018-04-26 ENCOUNTER — APPOINTMENT (OUTPATIENT)
Dept: UROLOGY | Facility: CLINIC | Age: 83
End: 2018-04-26

## 2018-07-23 PROBLEM — T83.9XXA: Status: ACTIVE | Noted: 2017-12-04

## 2019-01-08 NOTE — PROGRESS NOTE ADULT - PROBLEM/PLAN-6
Satisfactory
DISPLAY PLAN FREE TEXT

## 2019-08-05 PROBLEM — R56.9 SEIZURES: Status: ACTIVE | Noted: 2017-12-04

## 2021-11-13 NOTE — PROGRESS NOTE ADULT - SUBJECTIVE AND OBJECTIVE BOX
HPI:  84 y/o M, Portuguese speaking, walks with cane, lives with wife,  PMHx of CVA x 2 in past ( ,, , on warfarin), seizures, BPH, HTN, PPM ( placed about 2 yrs ago, last interrogated about 2 months ago)presents to ED c/o SOB x1 week. History provided by the granddaughter and wife at the bedside. According to her patient usually feels SOB while he walks short distance uses 3- 4 pillows at night, associated with lower extremity swelling. He has PPM placed about 2 yrs ago as per family his heart not functioning fine.  He has accident in , head surgery done , after that he has another stroke, placed on warfarin. Last INR checked about a month ago.  He also has dry cough. Denies fever, chills, chest pain, nausea, vomiting, sick contacts, recent travel, or any other complaints. His last visit to PMD about a week ago. Never had EGD and colonoscopy done. (25 Aug 2017 15:31)    yrsterday pt was having suprapubic pain and tenderness,  foleys was ordered.   before palumbo, pts US of bladder showed around 500 cc of urine.  as pt has penile implant, i called Urologist for insertion of foleys cath   urol called me after insertion of foleys.          INTERVAL HPI/OVERNIGHT EVENTS:  T(C): 37 (17 @ 11:19), Max: 37 (17 @ 11:19)  HR: 98 (17 @ 11:19) (63 - 98)  BP: 101/53 (17 @ 11:19) (101/53 - 119/74)  RR: 18 (17 @ 11:19) (17 - 20)  SpO2: 99% (17 @ 11:19) (99% - 100%)  Wt(kg): --  I&O's Summary    27 Aug 2017 07:01  -  28 Aug 2017 07:00  --------------------------------------------------------  IN: 0 mL / OUT: 400 mL / NET: -400 mL        REVIEW OF SYSTEMS: denies fever, chills, SOB, palpitations, chest pain, abdominal pain, nausea, vomitting, diarrhea, constipation, dizziness    MEDICATIONS  (STANDING):  furosemide   Injectable 40 milliGRAM(s) IV Push daily  ALBUTerol/ipratropium for Nebulization 3 milliLiter(s) Nebulizer every 6 hours  aspirin  chewable 81 milliGRAM(s) Oral daily  atorvastatin 40 milliGRAM(s) Oral at bedtime  metoprolol 12.5 milliGRAM(s) Oral two times a day  cefTRIAXone   IVPB 1 Gram(s) IV Intermittent every 24 hours  doxycycline hyclate Capsule 100 milliGRAM(s) Oral every 12 hours  tamsulosin 0.4 milliGRAM(s) Oral at bedtime  levETIRAcetam 1000 milliGRAM(s) Oral two times a day  citalopram 10 milliGRAM(s) Oral daily  losartan 25 milliGRAM(s) Oral daily  levothyroxine 12.5 MICROGram(s) Oral daily  cyanocobalamin 500 MICROGram(s) Oral daily  folic acid 1 milliGRAM(s) Oral daily    MEDICATIONS  (PRN):  acetaminophen   Tablet. 650 milliGRAM(s) Oral every 6 hours PRN Mild Pain (1 - 3)      PHYSICAL EXAM:  GENERAL: NAD, well-groomed, well-developed  HEAD:  Atraumatic, Normocephalic  EYES: EOMI, PERRLA, conjunctiva and sclera clear  ENMT: No tonsillar erythema, exudates, or enlargement; Moist mucous membranes, Good dentition, No lesions  NECK: Supple, No JVD, Normal thyroid  NERVOUS SYSTEM:  Alert & Oriented X3, old cva and contracture  CHEST/LUNG: Clear to percussion bilaterally; No rales, rhonchi, wheezing, or rubs  HEART: Regular rate and rhythm; No murmurs, rubs, or gallops  ABDOMEN: Soft, Nontender, Nondistended; Bowel sounds present  EXTREMITIES:  2+ Peripheral Pulses, No clubbing, cyanosis, or edema  LYMPH: No lymphadenopathy noted  SKIN: No rashes or lesions  LABS:                        11.1   9.9   )-----------( 132      ( 28 Aug 2017 06:52 )             32.4     08-    132<L>  |  99  |  22<H>  ----------------------------<  105<H>  4.0   |  24  |  1.15    Ca    9.0      28 Aug 2017 06:52  Phos  3.0       Mg     1.8         TPro  7.0  /  Alb  3.4<L>  /  TBili  1.2  /  DBili  x   /  AST  50<H>  /  ALT  36  /  AlkPhos  174<H>      PT/INR - ( 28 Aug 2017 06:52 )   PT: 34.7 sec;   INR: 3.11 ratio           Urinalysis Basic - ( 28 Aug 2017 05:28 )    Color: Brown / Appearance: Turbid / S.025 / pH: x  Gluc: x / Ketone: Trace  / Bili: Small / Urobili: 4   Blood: x / Protein: 500 mg/dL / Nitrite: Positive   Leuk Esterase: Small / RBC: >50 /HPF / WBC 3-5 /HPF   Sq Epi: x / Non Sq Epi: Few / Bacteria: x      CAPILLARY BLOOD GLUCOSE            Urinalysis Basic - ( 28 Aug 2017 05:28 )    Color: Brown / Appearance: Turbid / S.025 / pH: x  Gluc: x / Ketone: Trace  / Bili: Small / Urobili: 4   Blood: x / Protein: 500 mg/dL / Nitrite: Positive   Leuk Esterase: Small / RBC: >50 /HPF / WBC 3-5 /HPF   Sq Epi: x / Non Sq Epi: Few / Bacteria: x Initial (On Arrival)